# Patient Record
Sex: FEMALE | Race: BLACK OR AFRICAN AMERICAN | NOT HISPANIC OR LATINO | Employment: FULL TIME | URBAN - METROPOLITAN AREA
[De-identification: names, ages, dates, MRNs, and addresses within clinical notes are randomized per-mention and may not be internally consistent; named-entity substitution may affect disease eponyms.]

---

## 2018-07-12 ENCOUNTER — OFFICE VISIT (OUTPATIENT)
Dept: URGENT CARE | Facility: URGENT CARE | Age: 38
End: 2018-07-12
Payer: COMMERCIAL

## 2018-07-12 ENCOUNTER — RADIANT APPOINTMENT (OUTPATIENT)
Dept: GENERAL RADIOLOGY | Facility: CLINIC | Age: 38
End: 2018-07-12
Attending: FAMILY MEDICINE
Payer: COMMERCIAL

## 2018-07-12 VITALS
RESPIRATION RATE: 18 BRPM | WEIGHT: 158.7 LBS | DIASTOLIC BLOOD PRESSURE: 60 MMHG | BODY MASS INDEX: 24.86 KG/M2 | SYSTOLIC BLOOD PRESSURE: 100 MMHG | TEMPERATURE: 98.4 F | OXYGEN SATURATION: 98 % | HEART RATE: 100 BPM

## 2018-07-12 DIAGNOSIS — H61.21 IMPACTED CERUMEN OF RIGHT EAR: ICD-10-CM

## 2018-07-12 DIAGNOSIS — J01.90 ACUTE SINUSITIS WITH SYMPTOMS > 10 DAYS: ICD-10-CM

## 2018-07-12 DIAGNOSIS — R05.9 COUGH: Primary | ICD-10-CM

## 2018-07-12 PROCEDURE — 99214 OFFICE O/P EST MOD 30 MIN: CPT | Performed by: FAMILY MEDICINE

## 2018-07-12 PROCEDURE — 71046 X-RAY EXAM CHEST 2 VIEWS: CPT | Mod: FY

## 2018-07-12 RX ORDER — AZITHROMYCIN 250 MG/1
TABLET, FILM COATED ORAL
Qty: 6 TABLET | Refills: 0 | Status: SHIPPED | OUTPATIENT
Start: 2018-07-12 | End: 2018-07-17

## 2018-07-12 RX ORDER — MOMETASONE FUROATE MONOHYDRATE 50 UG/1
2 SPRAY, METERED NASAL DAILY
Qty: 1 G | Refills: 0 | Status: SHIPPED | OUTPATIENT
Start: 2018-07-12 | End: 2018-08-11

## 2018-07-12 NOTE — MR AVS SNAPSHOT
"              After Visit Summary   2018    Yun Marques    MRN: [id number]           Patient Information     Date Of Birth          1980        Visit Information        Provider Department      2018 5:15 PM Hilario Hogan DO [facility]        Today's Diagnoses     Cough    -  1    Acute sinusitis with symptoms > 10 days        Impacted cerumen of right ear           Follow-ups after your visit        Who to contact     If you have questions or need follow up information about today's clinic visit or your schedule please contact [facility] directly at [phone].  Normal or non-critical lab and imaging results will be communicated to you by ProPublicahart, letter or phone within 4 business days after the clinic has received the results. If you do not hear from us within 7 days, please contact the clinic through ProPublicahart or phone. If you have a critical or abnormal lab result, we will notify you by phone as soon as possible.  Submit refill requests through Trippy or call your pharmacy and they will forward the refill request to us. Please allow 3 business days for your refill to be completed.          Additional Information About Your Visit        MyChart Information     Trippy lets you send messages to your doctor, view your test results, renew your prescriptions, schedule appointments and more. To sign up, go to [email or web address] . Click on \"Log in\" on the left side of the screen, which will take you to the Welcome page. Then click on \"Sign up Now\" on the right side of the page.     You will be asked to enter the access code listed below, as well as some personal information. Please follow the directions to create your username and password.     Your access code is: [email or web address]  Expires: 10/10/2018  6:19 PM     Your access code will  in 90 days. If you need help or a new code, please call your [place] clinic or [phone].   "      Care EveryWhere ID     This is your Care EveryWhere ID. This could be used by other organizations to access your [place] medical records  [id number]        Your Vitals Were     Pulse Temperature Respirations Pulse Oximetry BMI (Body Mass Index)       100 98.4  F (36.9  C) (Oral) 18 98% 24.86 kg/m2        Blood Pressure from Last 3 Encounters:   07/12/18 100/60   02/09/16 128/78   09/26/15 106/68    Weight from Last 3 Encounters:   07/12/18 158 lb 11.2 oz (72 kg)   02/09/16 145 lb (65.8 kg)   09/26/15 153 lb 1.6 oz (69.4 kg)              We Performed the Following     Nursing Communication 1     XR Chest 2 Views          Today's Medication Changes          These changes are accurate as of 7/12/18  6:19 PM.  If you have any questions, ask your nurse or doctor.               Start taking these medicines.        Dose/Directions    azithromycin 250 MG tablet   Commonly known as:  ZITHROMAX   Used for:  Acute sinusitis with symptoms > 10 days   Started by:  Hilario Hogan, DO        Two tablets first day, then one tablet daily for four days.   Quantity:  6 tablet   Refills:  0       mometasone 50 MCG/ACT spray   Commonly known as:  NASONEX   Used for:  Acute sinusitis with symptoms > 10 days   Started by:  Hilario Hogan DO        Dose:  2 spray   Spray 2 sprays into both nostrils daily   Quantity:  1 g   Refills:  0            Where to get your medicines      These medications were sent to [facility] [address] [place] [address] [address] [facility] [address], [facility] [address]     Phone:  [phone]     azithromycin 250 MG tablet    mometasone 50 MCG/ACT spray                Primary Care Provider Office Phone # Fax #    Donato Pat [facility] [phone]       [facility] [address]        Equal Access to Services     MARIE KAUR AH: Darian thomas Sojose, waaxda luqadaha, qaybta kaalmaru wiley  krystin clayton ah. So [facility] [phone].    ATENCIÓN: Si zeniala indu, tiene a moore disposición servicios gratuitos de asistencia lingüística. Shereen al [phone].    We comply with applicable federal civil rights laws and [place] laws. We do not discriminate on the basis of race, color, national origin, age, disability, sex, sexual orientation, or gender identity.            Thank you!     Thank you for choosing [facility]  for your care. Our goal is always to provide you with excellent care. Hearing back from our patients is one way we can continue to improve our services. Please take a few minutes to complete the written survey that you may receive in the mail after your visit with us. Thank you!             Your Updated Medication List - Protect others around you: Learn how to safely use, store and throw away your medicines at www.disposemymeds.org.          This list is accurate as of 7/12/18  6:19 PM.  Always use your most recent med list.                   Brand Name Dispense Instructions for use Diagnosis    azithromycin 250 MG tablet    ZITHROMAX    6 tablet    Two tablets first day, then one tablet daily for four days.    Acute sinusitis with symptoms > 10 days       mometasone 50 MCG/ACT spray    NASONEX    1 g    Spray 2 sprays into both nostrils daily    Acute sinusitis with symptoms > 10 days

## 2018-07-12 NOTE — PROGRESS NOTES
SUBJECTIVE: Yun Marques is a 38 year old female presenting with a chief complaint of nasal congestion and cough. Also rt ear plugged  Onset of symptoms was 3 month(s) ago.  Course of illness is worsening.    Severity moderate  Current and Associated symptoms: runny nose, cough - non-productive and facial pain/pressure  Treatment measures tried include None tried.  Predisposing factors include None quit tobacco 2 months ago.    No past medical history on file.  Allergies   Allergen Reactions     Sulfa Drugs      HAD SEVERE REACTION- SWELLING BREATHING PROBS, WAS HOSPITALIZED     Social History   Substance Use Topics     Smoking status: Current Some Day Smoker     Smokeless tobacco: Never Used      Comment: 1-2 cigarettes per month(socially) - 09/26/15: hasn't smoked in months     Alcohol use Not on file       ROS:  SKIN: no rash  GI: no vomiting    OBJECTIVE:  /60  Pulse 100  Temp 98.4  F (36.9  C) (Oral)  Resp 18  Wt 158 lb 11.2 oz (72 kg)  SpO2 98%  BMI 24.86 kg/r5IOPJRCP APPEARANCE: healthy, alert and no distress  EYES: EOMI,  PERRL, conjunctiva clear  HENT: TM's normal bilaterally, cerumen right, rhinorrhea yellow, oral mucous membranes moist, no erythema noted and maxillary sinus tenderness   NECK: supple, nontender, no lymphadenopathy  RESP: lungs clear to auscultation - no rales, rhonchi or wheezes  SKIN: no suspicious lesions or rashes      ICD-10-CM    1. Cough R05 XR Chest 2 Views   2. Acute sinusitis with symptoms > 10 days J01.90 azithromycin (ZITHROMAX) 250 MG tablet     mometasone (NASONEX) 50 MCG/ACT spray   3. Impacted cerumen of right ear H61.21 Nursing Communication 1     Fluids/Rest, f/u if worse/not any better

## 2019-02-17 ENCOUNTER — OFFICE VISIT (OUTPATIENT)
Dept: URGENT CARE | Facility: URGENT CARE | Age: 39
End: 2019-02-17
Payer: COMMERCIAL

## 2019-02-17 VITALS
HEART RATE: 88 BPM | DIASTOLIC BLOOD PRESSURE: 80 MMHG | SYSTOLIC BLOOD PRESSURE: 114 MMHG | WEIGHT: 162 LBS | OXYGEN SATURATION: 98 % | TEMPERATURE: 98.4 F | RESPIRATION RATE: 20 BRPM | BODY MASS INDEX: 25.37 KG/M2

## 2019-02-17 DIAGNOSIS — R07.89 CHEST TIGHTNESS: Primary | ICD-10-CM

## 2019-02-17 DIAGNOSIS — R05.9 COUGH: ICD-10-CM

## 2019-02-17 DIAGNOSIS — R05.8 PRODUCTIVE COUGH: ICD-10-CM

## 2019-02-17 PROCEDURE — 99214 OFFICE O/P EST MOD 30 MIN: CPT | Performed by: PHYSICIAN ASSISTANT

## 2019-02-17 RX ORDER — ALBUTEROL SULFATE 90 UG/1
2 AEROSOL, METERED RESPIRATORY (INHALATION) EVERY 6 HOURS
Qty: 1 INHALER | Refills: 0 | Status: SHIPPED | OUTPATIENT
Start: 2019-02-17 | End: 2022-09-09

## 2019-02-17 RX ORDER — BENZONATATE 200 MG/1
200 CAPSULE ORAL 3 TIMES DAILY PRN
Qty: 21 CAPSULE | Refills: 0 | Status: SHIPPED | OUTPATIENT
Start: 2019-02-17 | End: 2019-02-24

## 2019-02-17 RX ORDER — AZITHROMYCIN 250 MG/1
TABLET, FILM COATED ORAL
Qty: 6 TABLET | Refills: 0 | Status: SHIPPED | OUTPATIENT
Start: 2019-02-17 | End: 2022-09-09

## 2019-03-01 NOTE — PROGRESS NOTES
SUBJECTIVE:   Yun Marques is a 38 year old female presenting with a chief complaint of runny nose, stuffy nose, cough - non-productive and facial pain/pressure.  Onset of symptoms was 1 month(s) ago.  Course of illness is same.    Severity moderate  Current and Associated symptoms: stuffy nose, cough - productive and chest congestion  Treatment measures tried include Decongestants and OTC Cough med.  Predisposing factors include recent illness .    PMH  Obesity  UTI     Allergies   Allergen Reactions     Sulfa Drugs      HAD SEVERE REACTION- SWELLING BREATHING PROBS, WAS HOSPITALIZED         Social History     Tobacco Use     Smoking status: Current Some Day Smoker     Smokeless tobacco: Never Used     Tobacco comment: 1-2 cigarettes per month(socially) - 09/26/15: hasn't smoked in months   Substance Use Topics     Alcohol use: Not on file       ROS:  CONSTITUTIONAL:POSITIVE  for chills  INTEGUMENTARY/SKIN: NEGATIVE for worrisome rashes, moles or lesions  EYES: NEGATIVE for vision changes or irritation  ENT/MOUTH: POSITIVE for postnasal drainage  RESP:POSITIVE for cough-productive  CV: NEGATIVE for chest pain, palpitations or peripheral edema  GI: NEGATIVE for nausea, abdominal pain, heartburn, or change in bowel habits  : negative for and dysuria  MUSCULOSKELETAL: NEGATIVE for significant arthralgias or myalgia  NEURO: NEGATIVE for weakness, dizziness or paresthesias    OBJECTIVE  :/80 (Cuff Size: Adult Regular)   Pulse 88   Temp 98.4  F (36.9  C) (Oral)   Resp 20   Wt 73.5 kg (162 lb)   SpO2 98%   BMI 25.37 kg/m    GENERAL APPEARANCE: healthy, alert and no distress  EYES: EOMI,  PERRL, conjunctiva clear  HENT: ear canals and TM's normal.  Nose and mouth without ulcers, erythema or lesions  NECK: supple, nontender, no lymphadenopathy  RESP: lungs clear to auscultation - no rales, rhonchi or wheezes  CV: regular rates and rhythm, normal S1 S2, no murmur noted  ABDOMEN:  soft, nontender, no HSM or  masses and bowel sounds normal  Extremities: no peripheral edema or tenderness, peripheral pulses normal  MS: extremities normal- no gross deformities noted, no erythema, FROM noted in all extremities  NEURO: Normal strength and tone, sensory exam grossly normal,  normal speech and mentation  SKIN: no suspicious lesions or rashes    ASSESSMENT/PLAN      ICD-10-CM    1. Chest tightness R07.89 albuterol (PROVENTIL HFA) 108 (90 Base) MCG/ACT inhaler   2. Cough R05 albuterol (PROVENTIL HFA) 108 (90 Base) MCG/ACT inhaler     benzonatate (TESSALON) 200 MG capsule   3. Productive cough R05 azithromycin (ZITHROMAX) 250 MG tablet       Orders Placed This Encounter     azithromycin (ZITHROMAX) 250 MG tablet     albuterol (PROVENTIL HFA) 108 (90 Base) MCG/ACT inhaler     benzonatate (TESSALON) 200 MG capsule       Tylenol, Fluids, Rest, OTC cough suppressant/expectorant and Saline gargles  Follow up with PCP as needed  See orders in Epic

## 2019-03-12 ENCOUNTER — OFFICE VISIT (OUTPATIENT)
Dept: URGENT CARE | Facility: URGENT CARE | Age: 39
End: 2019-03-12
Payer: COMMERCIAL

## 2019-03-12 VITALS
TEMPERATURE: 99.7 F | BODY MASS INDEX: 25.84 KG/M2 | HEART RATE: 110 BPM | DIASTOLIC BLOOD PRESSURE: 80 MMHG | SYSTOLIC BLOOD PRESSURE: 108 MMHG | OXYGEN SATURATION: 99 % | WEIGHT: 165 LBS

## 2019-03-12 DIAGNOSIS — J30.9 ALLERGIC RHINITIS, UNSPECIFIED SEASONALITY, UNSPECIFIED TRIGGER: Primary | ICD-10-CM

## 2019-03-12 PROCEDURE — 99213 OFFICE O/P EST LOW 20 MIN: CPT | Performed by: PHYSICIAN ASSISTANT

## 2019-03-12 RX ORDER — MOMETASONE FUROATE MONOHYDRATE 50 UG/1
2 SPRAY, METERED NASAL DAILY
Qty: 1 BOX | Refills: 3 | Status: SHIPPED | OUTPATIENT
Start: 2019-03-12 | End: 2022-09-09

## 2019-03-13 NOTE — PROGRESS NOTES
Patient presents with:  Urgent Care: cough,sore throat, sinus problems. 4xdays      SUBJECTIVE:   Yun Marques is a 39 year old female presenting with a chief complaint of   1) dry cough for a week  2) sore throat since yesterday  3) sinus congestion for a few days    Denies any body aches    Low grade fever     Onset of symptoms was as above.    Consistent with her typical allergies for which she usually uses Nasonex and Claritin D    Treatment measures tried include none tried.  Predisposing factors include seasonal allergies.    No past medical history on file.  There is no problem list on file for this patient.    Social History     Tobacco Use     Smoking status: Current Some Day Smoker     Smokeless tobacco: Never Used     Tobacco comment: 1-2 cigarettes per month(socially) - 09/26/15: hasn't smoked in months   Substance Use Topics     Alcohol use: Not on file       ROS:  CONSTITUTIONAL:NEGATIVE for fever, chills, change in weight  INTEGUMENTARY/SKIN: NEGATIVE for worrisome rashes, moles or lesions  EYES: NEGATIVE for vision changes or irritation  ENT/MOUTH: as per HPI  RESP:as per HPI  CV: NEGATIVE for chest pain, palpitations or peripheral edema  GI: NEGATIVE for nausea, abdominal pain, heartburn, or change in bowel habits  MUSCULOSKELETAL: NEGATIVE for significant arthralgias or myalgia  NEURO: NEGATIVE for weakness, dizziness or paresthesias  Review of systems negative except as stated above.    OBJECTIVE  :/80   Pulse 110   Temp 99.7  F (37.6  C) (Tympanic)   Wt 74.8 kg (165 lb)   SpO2 99%   BMI 25.84 kg/m    GENERAL APPEARANCE: healthy, alert and no distress  EYES: EOMI,  PERRL, conjunctiva clear  HENT: ear canals and TM's normal.  Nose and mouth without ulcers, erythema or lesions  HENT: nasal turbinates boggy with bluish hue and rhinorrhea clear  NECK: supple, nontender, no lymphadenopathy  RESP: lungs clear to auscultation - no rales, rhonchi or wheezes  CV: regular rates and rhythm,  normal S1 S2, no murmur noted  ABDOMEN:  soft, nontender, no HSM or masses and bowel sounds normal  NEURO: Normal strength and tone, sensory exam grossly normal,  normal speech and mentation  SKIN: no suspicious lesions or rashes    (J30.9) Allergic rhinitis, unspecified seasonality, unspecified trigger  (primary encounter diagnosis)  Comment: with recent sinusitis on 2/17/19  Plan: mometasone (NASONEX) 50 MCG/ACT nasal spray,         loratadine-pseudoePHEDrine (CLARITIN-D 12-HOUR)        5-120 MG 12 hr tablet          Patient declines sinus film today

## 2019-03-13 NOTE — PATIENT INSTRUCTIONS
(J30.9) Allergic rhinitis, unspecified seasonality, unspecified trigger  (primary encounter diagnosis)  Comment:   Plan: mometasone (NASONEX) 50 MCG/ACT nasal spray,         loratadine-pseudoePHEDrine (CLARITIN-D 12-HOUR)        5-120 MG 12 hr tablet            Consider taking daily PLAIN claritin 10 mg during allergy season and adding a decongestant combination claritin as needed.  Use nasonex daily during allergy season, bump up to twice a day for the first couple of days      Use saline nasal spray during day    Follow up with primary clinic should symptoms persist or worsen.

## 2019-11-07 ENCOUNTER — HEALTH MAINTENANCE LETTER (OUTPATIENT)
Age: 39
End: 2019-11-07

## 2020-11-29 ENCOUNTER — HEALTH MAINTENANCE LETTER (OUTPATIENT)
Age: 40
End: 2020-11-29

## 2021-09-19 ENCOUNTER — HEALTH MAINTENANCE LETTER (OUTPATIENT)
Age: 41
End: 2021-09-19

## 2022-01-09 ENCOUNTER — HEALTH MAINTENANCE LETTER (OUTPATIENT)
Age: 42
End: 2022-01-09

## 2022-09-09 ENCOUNTER — OFFICE VISIT (OUTPATIENT)
Dept: FAMILY MEDICINE | Facility: CLINIC | Age: 42
End: 2022-09-09
Payer: COMMERCIAL

## 2022-09-09 VITALS
BODY MASS INDEX: 26.62 KG/M2 | SYSTOLIC BLOOD PRESSURE: 140 MMHG | WEIGHT: 169.6 LBS | OXYGEN SATURATION: 99 % | HEART RATE: 93 BPM | HEIGHT: 67 IN | DIASTOLIC BLOOD PRESSURE: 90 MMHG | TEMPERATURE: 98.1 F

## 2022-09-09 DIAGNOSIS — F32.1 CURRENT MODERATE EPISODE OF MAJOR DEPRESSIVE DISORDER WITHOUT PRIOR EPISODE (H): ICD-10-CM

## 2022-09-09 DIAGNOSIS — Z13.220 ENCOUNTER FOR LIPID SCREENING FOR CARDIOVASCULAR DISEASE: ICD-10-CM

## 2022-09-09 DIAGNOSIS — Z13.21 ENCOUNTER FOR VITAMIN DEFICIENCY SCREENING: ICD-10-CM

## 2022-09-09 DIAGNOSIS — F41.1 GENERALIZED ANXIETY DISORDER: ICD-10-CM

## 2022-09-09 DIAGNOSIS — R14.2 FLATULENCE, ERUCTATION AND GAS PAIN: ICD-10-CM

## 2022-09-09 DIAGNOSIS — R03.0 ELEVATED BLOOD PRESSURE READING WITHOUT DIAGNOSIS OF HYPERTENSION: ICD-10-CM

## 2022-09-09 DIAGNOSIS — K21.9 GASTROESOPHAGEAL REFLUX DISEASE, UNSPECIFIED WHETHER ESOPHAGITIS PRESENT: ICD-10-CM

## 2022-09-09 DIAGNOSIS — Z11.4 SCREENING FOR HIV (HUMAN IMMUNODEFICIENCY VIRUS): ICD-10-CM

## 2022-09-09 DIAGNOSIS — Z11.59 NEED FOR HEPATITIS C SCREENING TEST: ICD-10-CM

## 2022-09-09 DIAGNOSIS — Z13.6 ENCOUNTER FOR LIPID SCREENING FOR CARDIOVASCULAR DISEASE: ICD-10-CM

## 2022-09-09 DIAGNOSIS — R63.5 ABNORMAL WEIGHT GAIN: ICD-10-CM

## 2022-09-09 DIAGNOSIS — Z00.00 ROUTINE GENERAL MEDICAL EXAMINATION AT A HEALTH CARE FACILITY: Primary | ICD-10-CM

## 2022-09-09 DIAGNOSIS — M54.50 LEFT-SIDED LOW BACK PAIN WITHOUT SCIATICA, UNSPECIFIED CHRONICITY: ICD-10-CM

## 2022-09-09 DIAGNOSIS — J01.90 ACUTE SINUSITIS WITH SYMPTOMS > 10 DAYS: ICD-10-CM

## 2022-09-09 DIAGNOSIS — J98.01 COLD INDUCED BRONCHOSPASM: ICD-10-CM

## 2022-09-09 DIAGNOSIS — D25.9 UTERINE LEIOMYOMA, UNSPECIFIED LOCATION: ICD-10-CM

## 2022-09-09 DIAGNOSIS — R14.3 FLATULENCE, ERUCTATION AND GAS PAIN: ICD-10-CM

## 2022-09-09 DIAGNOSIS — N89.8 VAGINAL DISCHARGE: ICD-10-CM

## 2022-09-09 DIAGNOSIS — Z12.4 CERVICAL CANCER SCREENING: ICD-10-CM

## 2022-09-09 DIAGNOSIS — R14.1 FLATULENCE, ERUCTATION AND GAS PAIN: ICD-10-CM

## 2022-09-09 DIAGNOSIS — R10.819 SUPRAPUBIC TENDERNESS: ICD-10-CM

## 2022-09-09 DIAGNOSIS — Z11.3 ENCOUNTER FOR SCREENING EXAMINATION FOR SEXUALLY TRANSMITTED DISEASE: ICD-10-CM

## 2022-09-09 LAB
CLUE CELLS: NORMAL
TRICHOMONAS, WET PREP: NORMAL
WBC'S/HIGH POWER FIELD, WET PREP: NORMAL
YEAST, WET PREP: NORMAL

## 2022-09-09 PROCEDURE — 80061 LIPID PANEL: CPT | Performed by: INTERNAL MEDICINE

## 2022-09-09 PROCEDURE — 87340 HEPATITIS B SURFACE AG IA: CPT | Performed by: INTERNAL MEDICINE

## 2022-09-09 PROCEDURE — 99386 PREV VISIT NEW AGE 40-64: CPT | Mod: 25 | Performed by: INTERNAL MEDICINE

## 2022-09-09 PROCEDURE — 87591 N.GONORRHOEAE DNA AMP PROB: CPT | Performed by: INTERNAL MEDICINE

## 2022-09-09 PROCEDURE — 87491 CHLMYD TRACH DNA AMP PROBE: CPT | Performed by: INTERNAL MEDICINE

## 2022-09-09 PROCEDURE — 84443 ASSAY THYROID STIM HORMONE: CPT | Performed by: INTERNAL MEDICINE

## 2022-09-09 PROCEDURE — 99215 OFFICE O/P EST HI 40 MIN: CPT | Mod: 25 | Performed by: INTERNAL MEDICINE

## 2022-09-09 PROCEDURE — 86709 HEPATITIS A IGM ANTIBODY: CPT | Performed by: INTERNAL MEDICINE

## 2022-09-09 PROCEDURE — 87210 SMEAR WET MOUNT SALINE/INK: CPT | Performed by: INTERNAL MEDICINE

## 2022-09-09 PROCEDURE — G0145 SCR C/V CYTO,THINLAYER,RESCR: HCPCS | Performed by: INTERNAL MEDICINE

## 2022-09-09 PROCEDURE — 87389 HIV-1 AG W/HIV-1&-2 AB AG IA: CPT | Performed by: INTERNAL MEDICINE

## 2022-09-09 PROCEDURE — 86803 HEPATITIS C AB TEST: CPT | Performed by: INTERNAL MEDICINE

## 2022-09-09 PROCEDURE — 36415 COLL VENOUS BLD VENIPUNCTURE: CPT | Performed by: INTERNAL MEDICINE

## 2022-09-09 PROCEDURE — 90677 PCV20 VACCINE IM: CPT | Performed by: INTERNAL MEDICINE

## 2022-09-09 PROCEDURE — 87624 HPV HI-RISK TYP POOLED RSLT: CPT | Performed by: INTERNAL MEDICINE

## 2022-09-09 PROCEDURE — 90471 IMMUNIZATION ADMIN: CPT | Performed by: INTERNAL MEDICINE

## 2022-09-09 RX ORDER — BLOOD PRESSURE TEST KIT
KIT MISCELLANEOUS
Qty: 1 KIT | Refills: 0 | Status: SHIPPED | OUTPATIENT
Start: 2022-09-09

## 2022-09-09 RX ORDER — AZITHROMYCIN 250 MG/1
TABLET, FILM COATED ORAL
Qty: 6 TABLET | Refills: 0 | Status: SHIPPED | OUTPATIENT
Start: 2022-09-09 | End: 2022-11-29

## 2022-09-09 RX ORDER — PREDNISONE 20 MG/1
40 TABLET ORAL DAILY
Qty: 10 TABLET | Refills: 0 | Status: SHIPPED | OUTPATIENT
Start: 2022-09-09 | End: 2022-12-08

## 2022-09-09 RX ORDER — ALBUTEROL SULFATE 90 UG/1
2 AEROSOL, METERED RESPIRATORY (INHALATION) EVERY 6 HOURS
Qty: 18 G | Refills: 1 | Status: SHIPPED | OUTPATIENT
Start: 2022-09-09 | End: 2022-09-09

## 2022-09-09 RX ORDER — SIMETHICONE 80 MG
80 TABLET,CHEWABLE ORAL EVERY 6 HOURS PRN
Qty: 30 TABLET | Refills: 1 | Status: SHIPPED | OUTPATIENT
Start: 2022-09-09

## 2022-09-09 RX ORDER — FLUTICASONE PROPIONATE 50 MCG
SPRAY, SUSPENSION (ML) NASAL
COMMUNITY
Start: 2021-12-04

## 2022-09-09 RX ORDER — ALBUTEROL SULFATE 90 UG/1
2 AEROSOL, METERED RESPIRATORY (INHALATION) EVERY 6 HOURS
Qty: 18 G | Refills: 0 | Status: SHIPPED | OUTPATIENT
Start: 2022-09-09 | End: 2022-11-29

## 2022-09-09 RX ORDER — SERTRALINE HYDROCHLORIDE 25 MG/1
25 TABLET, FILM COATED ORAL DAILY
Qty: 30 TABLET | Refills: 1 | Status: SHIPPED | OUTPATIENT
Start: 2022-09-09

## 2022-09-09 ASSESSMENT — ENCOUNTER SYMPTOMS
BREAST MASS: 0
ABDOMINAL PAIN: 1
COUGH: 1
CONSTIPATION: 1
HEARTBURN: 1

## 2022-09-09 ASSESSMENT — PATIENT HEALTH QUESTIONNAIRE - PHQ9
10. IF YOU CHECKED OFF ANY PROBLEMS, HOW DIFFICULT HAVE THESE PROBLEMS MADE IT FOR YOU TO DO YOUR WORK, TAKE CARE OF THINGS AT HOME, OR GET ALONG WITH OTHER PEOPLE: SOMEWHAT DIFFICULT
SUM OF ALL RESPONSES TO PHQ QUESTIONS 1-9: 15
SUM OF ALL RESPONSES TO PHQ QUESTIONS 1-9: 15

## 2022-09-09 ASSESSMENT — PAIN SCALES - GENERAL: PAINLEVEL: MILD PAIN (2)

## 2022-09-09 NOTE — PATIENT INSTRUCTIONS
As discussed , please do fasting labs as ordered.   Started on depresison medication   Please do physical therapy with muscle relaxor as needed only in the night as it causes drowsiness.   Sent in antibiotic , prednisone and Albuterol inhaler for your Cough and sinusitis.     Please send me BP log for next 10 days to decide on starting medication if high . Please make E-visit in 10 days .     Started on Omeprazole and anti flatulant for symptoms.   ========================    Preventive Health Recommendations  Female Ages 40 to 49    Yearly exam:   See your health care provider every year in order to  Review health changes.   Discuss preventive care.    Review your medicines if your doctor prescribed any.    Get a Pap test every three years (unless you have an abnormal result and your provider advises testing more often).    If you get Pap tests with HPV test, you only need to test every 5 years, unless you have an abnormal result. You do not need a Pap test if your uterus was removed (hysterectomy) and you have not had cancer.    You should be tested each year for STDs (sexually transmitted diseases), if you're at risk.   Ask your doctor if you should have a mammogram.    Have a colonoscopy (test for colon cancer) if someone in your family has had colon cancer or polyps before age 50.     Have a cholesterol test every 5 years.     Have a diabetes test (fasting glucose) after age 45. If you are at risk for diabetes, you should have this test every 3 years.    Shots: Get a flu shot each year. Get a tetanus shot every 10 years.     Nutrition:   Eat at least 5 servings of fruits and vegetables each day.  Eat whole-grain bread, whole-wheat pasta and brown rice instead of white grains and rice.  Get adequate Calcium and Vitamin D.      Lifestyle  Exercise at least 150 minutes a week (an average of 30 minutes a day, 5 days a week). This will help you control your weight and prevent disease.  Limit alcohol to one drink per  day.  No smoking.   Wear sunscreen to prevent skin cancer.  See your dentist every six months for an exam and cleaning.

## 2022-09-09 NOTE — PROGRESS NOTES
SUBJECTIVE:   CC: Yun Marques is an 42 year old woman who presents for preventive health visit.     Patient has been advised of split billing requirements and indicates understanding: Yes  Healthy Habits:     Getting at least 3 servings of Calcium per day:  Yes    Bi-annual eye exam:  NO    Dental care twice a year:  Yes    Sleep apnea or symptoms of sleep apnea:  Daytime drowsiness    Diet:  Regular (no restrictions) and Breakfast skipped    Duration of exercise:  45-60 minutes    Taking medications regularly:  Yes    Medication side effects:  Not applicable    PHQ-2 Total Score: 6    Additional concerns today:  Yes      Today's PHQ-2 Score:   PHQ-2 (  [employer]) 2022   Q1: Little interest or pleasure in doing things 3   Q2: Feeling down, depressed or hopeless 3   PHQ-2 Score 6   Q1: Little interest or pleasure in doing things Nearly every day   Q2: Feeling down, depressed or hopeless Several days   PHQ-2 Score 4       Abuse: Current or Past (Physical, Sexual or Emotional) - No  Do you feel safe in your environment? Yes    Have you ever done Advance Care Planning? (For example, a Health Directive, POLST, or a discussion with a medical provider or your loved ones about your wishes): No, advance care planning information given to patient to review.  Patient plans to discuss their wishes with loved ones or provider.      Social History     Tobacco Use     Smoking status: Former Smoker     Years: 10.00     Types: Cigarettes     Start date: 2000     Quit date: 2017     Years since quittin.6     Smokeless tobacco: Never Used     Tobacco comment: 1-2 cigarettes per month(socially) - 09/26/15: hasn't smoked in months   Substance Use Topics     Alcohol use: Yes     If you drink alcohol do you typically have >3 drinks per day or >7 drinks per week? Yes    Alcohol Use 2022   Prescreen: >3 drinks/day or >7 drinks/week? Yes   AUDIT SCORE  14     AUDIT - Alcohol Use Disorders Identification Test -  Reproduced from the World Health Organization Audit 2001 (Second Edition) 9/9/2022   1.  How often do you have a drink containing alcohol? 4 or more times a week   2.  How many drinks containing alcohol do you have on a typical day when you are drinking? 3 or 4   3.  How often do you have five or more drinks on one occasion? Daily or almost daily   4.  How often during the last year have you found that you were not able to stop drinking once you had started? Daily or almost daily   5.  How often during the last year have you failed to do what was normally expected of you because of drinking? Never   6.  How often during the last year have you needed a first drink in the morning to get yourself going after a heavy drinking session? Less than monthly   7.  How often during the last year have you had a feeling of guilt or remorse after drinking? Never   8.  How often during the last year have you been unable to remember what happened the night before because of your drinking? Never   9.  Have you or someone else been injured because of your drinking? No   10. Has a relative, friend, doctor or other health care worker been concerned about your drinking or suggested you cut down? No   TOTAL SCORE 14       Reviewed orders with patient.  Reviewed health maintenance and updated orders accordingly - Yes  Lab work is in process  Labs reviewed in EPIC    Breast Cancer Screening:    FHS-7:   Breast CA Risk Assessment (FHS-7) 9/9/2022   Did any of your first-degree relatives have breast or ovarian cancer? Unknown   Did any of your relatives have bilateral breast cancer? Yes   Did any man in your family have breast cancer? No   Did any woman in your family have breast and ovarian cancer? No   Did any woman in your family have breast cancer before age 50 y? Yes   Do you have 2 or more relatives with breast and/or ovarian cancer? No   Do you have 2 or more relatives with breast and/or bowel cancer? No     click delete button to  "remove this line now  Mammogram Screening - Offered annual screening and updated Health Maintenance for mutual plan based on risk factor consideration    Pertinent mammograms are reviewed under the imaging tab.    History of abnormal Pap smear: NO - age 30- 65 PAP every 3 years recommended     Reviewed and updated as needed this visit by clinical staff   Tobacco  Allergies  Meds  Problems  Med Hx  Surg Hx  Fam Hx            Reviewed and updated as needed this visit by Provider   Tobacco  Allergies  Meds  Problems  Med Hx  Surg Hx  Fam Hx           Past Medical History:   Diagnosis Date     Uncomplicated asthma       History reviewed. No pertinent surgical history.  OB History   No obstetric history on file.       Review of Systems   HENT: Positive for congestion.    Respiratory: Positive for cough.    Gastrointestinal: Positive for abdominal pain, constipation and heartburn.   Breasts:  Negative for tenderness, breast mass and discharge.   Genitourinary: Negative for pelvic pain, vaginal bleeding and vaginal discharge.     CONSTITUTIONAL: NEGATIVE for fever, chills, change in weight  INTEGUMENTARU/SKIN: NEGATIVE for worrisome rashes, moles or lesions  EYES: NEGATIVE for vision changes or irritation  ENT: NEGATIVE for ear, mouth and throat problems  RESP: NEGATIVE for significant cough or SOB  BREAST: NEGATIVE for masses, tenderness or discharge  CV: NEGATIVE for chest pain, palpitations or peripheral edema  GI: NEGATIVE for nausea, abdominal pain, heartburn, or change in bowel habits  : NEGATIVE for unusual urinary or vaginal symptoms. Periods are regular.  MUSCULOSKELETAL: NEGATIVE for significant arthralgias or myalgia  NEURO: NEGATIVE for weakness, dizziness or paresthesias  PSYCHIATRIC: NEGATIVE for changes in mood or affect     OBJECTIVE:   BP (!) 140/90   Pulse 93   Temp 98.1  F (36.7  C) (Temporal)   Ht 1.702 m (5' 7\")   Wt 76.9 kg (169 lb 9.6 oz)   SpO2 99%   BMI 26.56 kg/m    Physical " Exam  GENERAL: healthy, alert and no distress  EYES: Eyes grossly normal to inspection, PERRL and conjunctivae and sclerae normal  HENT: ear canals and TM's normal, nose and mouth without ulcers or lesions. Positive for sinus tenderness  NECK: no adenopathy, no asymmetry, masses, or scars and thyroid normal to palpation  RESP: lungs clear to auscultation - no rales, rhonchi or wheezes  BREAST: normal without masses, tenderness or nipple discharge and no palpable axillary masses or adenopathy  CV: regular rate and rhythm, normal S1 S2, no S3 or S4, no murmur, click or rub, no peripheral edema and peripheral pulses strong  ABDOMEN: soft, Positive for generalized abdominal discomfort and suprapubic tenderness , no hepatosplenomegaly, no masses and bowel sounds normal  MS: no gross musculoskeletal defects noted, no edema  Positive for lower left paraspinal muscle spasm on the lumbar region.   SKIN: no suspicious lesions or rashes  NEURO: Normal strength and tone, mentation intact and speech normal  PSYCH: mentation appears normal, affect normal/bright    Pelvic Exam:  Vulva: No external lesions, normal hair distribution, no adenopathy  Vagina: Moist, pink, no abnormal discharge, well rugated, no lesions  Cervix: Pap smear is taken, parous, smooth, pink, no visible lesions  Uterus: Normal size, anteverted, non-tender, mobile  Ovaries: No mass, non-tender, mobile    Diagnostic Test Results:  Labs reviewed in Epic    ASSESSMENT/PLAN:     Assessment and Plan  1. Routine general medical examination at a health care facility  Pt is new to [facility] has been folowing outside facilities, last checked in 2018 outside, for OCPs.but currently denies any needs as she states she is not sexually active She is here for physical but does have multiple new concerns of Sinusitis, Cough, Back pain, Abdominal discomfort , depresison   - REVIEW OF HEALTH MAINTENANCE PROTOCOL ORDERS  - Pneumococcal 20 Valent Conjugate (Prevnar 20)  - HIV Antigen  Antibody Combo; Future  - Hepatitis C Screen Reflex to HCV RNA Quant and Genotype; Future  - Pap Screen with HPV - recommended age 30 - 65 years  - HIV Antigen Antibody Combo  - Hepatitis C Screen Reflex to HCV RNA Quant and Genotype    2. Cervical cancer screening  - Pap Screen with HPV - recommended age 30 - 65 years    3. Acute sinusitis with symptoms > 10 days  New problem, , given the sinus tenderness and congestion will give antibiotic and prednisone for improvement.   - azithromycin (ZITHROMAX) 250 MG tablet; Two tablets first day, then one tablet daily for four days.  Dispense: 6 tablet; Refill: 0  - predniSONE (DELTASONE) 20 MG tablet; Take 2 tablets (40 mg) by mouth daily  Dispense: 10 tablet; Refill: 0    4. Cold induced bronchospasm  New problem, given the sinus tenderness and congestion will give antibiotic and prednisone for improvement along with Albuterol inhaler.   - albuterol (PROAIR HFA/PROVENTIL HFA/VENTOLIN HFA) 108 (90 Base) MCG/ACT inhaler; Inhale 2 puffs into the lungs every 6 hours  Dispense: 18 g; Refill: 0    5. Current moderate episode of major depressive disorder without prior episode (H)  6. Generalized anxiety disorder  New problem, Uncontrolled. Denies SI/HI. Will start on low dose Zoloft . Pt to revisit with PHQ and ADALI on E-visit in 4 weeks. Pt understood and agreed with the plan   - sertraline (ZOLOFT) 25 MG tablet; Take 1 tablet (25 mg) by mouth daily  Dispense: 30 tablet; Refill: 1  - TSH with free T4 reflex; Future  - TSH with free T4 reflex    7. Abnormal weight gain  - TSH with free T4 reflex; Future  - TSH with free T4 reflex    8. Vaginal discharge  New problem, will check vaginal swab and send treatment if positive   - Wet prep - lab collect; Future  - Wet prep - lab collect    9. Suprapubic tenderness  New problem, will check Urinalysis and send treatment if positive   - UA with Microscopic reflex to Culture - lab collect; Future    10. Uterine leiomyoma, unspecified  location  Chronic problem, possibly causing suprapubic tenderness on palpation and pelvic exam    11. Left-sided low back pain without sciatica, unspecified chronicity  New problem, with tenderness on palpation of the left paraspinal region in lumbar level. Will start on Zanaflex as needed, side effect explained. Placed referral to physical therapy.   - tiZANidine (ZANAFLEX) 4 MG tablet; Take 1 tablet (4 mg) by mouth nightly as needed for muscle spasms  Dispense: 30 tablet; Refill: 0  - Physical Therapy Referral; Future    12. Encounter for screening examination for sexually transmitted disease  - NEISSERIA GONORRHOEA PCR; Future  - CHLAMYDIA TRACHOMATIS PCR; Future  - Hepatitis B surface antigen; Future  - Hepatitis A antibody IgM; Future  - NEISSERIA GONORRHOEA PCR  - CHLAMYDIA TRACHOMATIS PCR  - Hepatitis B surface antigen  - Hepatitis A antibody IgM    13. Encounter for lipid screening for cardiovascular disease  - Lipid panel reflex to direct LDL Fasting; Future  - Lipid panel reflex to direct LDL Fasting    14. Encounter for vitamin deficiency screening  - Vitamin D Deficiency; Future    15. Elevated blood pressure reading without diagnosis of hypertension  Ongoing problem, Uncontrolled. Pt does have FH of HTN. Discussed on starting on low dose Lisinopril which she will let us know after checking her BP log in 10 days. Please see AVS for details.   - Blood Pressure KIT; Take BP twice daily  Dispense: 1 kit; Refill: 0    16. Need for hepatitis C screening test  - Hepatitis C Screen Reflex to HCV RNA Quant and Genotype; Future  - Hepatitis C Screen Reflex to HCV RNA Quant and Genotype    17. Screening for HIV (human immunodeficiency virus)  - HIV Antigen Antibody Combo; Future  - HIV Antigen Antibody Combo    18. Gastroesophageal reflux disease, unspecified whether esophagitis present  New problem, given Abdominal discomfort and flatulance will given trial of PPI for improvement.   - simethicone (MYLICON) 80 MG  chewable tablet; Take 1 tablet (80 mg) by mouth every 6 hours as needed for flatulence or cramping  Dispense: 30 tablet; Refill: 1  - omeprazole (PRILOSEC) 20 MG DR capsule; Take 1 capsule (20 mg) by mouth daily  Dispense: 30 capsule; Refill: 1    19. Flatulence, eructation and gas pain  New problem, due to above,. Will treat symptomatically.   - simethicone (MYLICON) 80 MG chewable tablet; Take 1 tablet (80 mg) by mouth every 6 hours as needed for flatulence or cramping  Dispense: 30 tablet; Refill: 1       Over 60 minutes spent on reviewing patient chart,  face to face encounter, greater than 50% time spent with plan/cordination of care and documentation as above in my A/P.           Patient Instructions   As discussed , please do fasting labs as ordered.   Started on depresison medication   Please do physical therapy with muscle relaxor as needed only in the night as it causes drowsiness.   Sent in antibiotic , prednisone and Albuterol inhaler for your Cough and sinusitis.     Please send me BP log for next 10 days to decide on starting medication if high . Please make E-visit in 10 days . \[phone]\      ========================    Preventive Health Recommendations  Female Ages 40 to 49    Yearly exam:     See your health care provider every year in order to  1. Review health changes.   2. Discuss preventive care.    3. Review your medicines if your doctor prescribed any.      Get a Pap test every three years (unless you have an abnormal result and your provider advises testing more often).      If you get Pap tests with HPV test, you only need to test every 5 years, unless you have an abnormal result. You do not need a Pap test if your uterus was removed (hysterectomy) and you have not had cancer.      You should be tested each year for STDs (sexually transmitted diseases), if you're at risk.     Ask your doctor if you should have a mammogram.      Have a colonoscopy (test for colon cancer) if  "someone in your family has had colon cancer or polyps before age 50.       Have a cholesterol test every 5 years.       Have a diabetes test (fasting glucose) after age 45. If you are at risk for diabetes, you should have this test every 3 years.    Shots: Get a flu shot each year. Get a tetanus shot every 10 years.     Nutrition:     Eat at least 5 servings of fruits and vegetables each day.    Eat whole-grain bread, whole-wheat pasta and brown rice instead of white grains and rice.    Get adequate Calcium and Vitamin D.      Lifestyle    Exercise at least 150 minutes a week (an average of 30 minutes a day, 5 days a week). This will help you control your weight and prevent disease.    Limit alcohol to one drink per day.    No smoking.     Wear sunscreen to prevent skin cancer.    See your dentist every six months for an exam and cleaning.    Return in about 6 months (around 3/9/2023), or if symptoms worsen or fail to improve, for Follow up of last visit, depression.    Therese Stewart MD  [facility]      Patient has been advised of split billing requirements and indicates understanding: Yes    COUNSELING:  Reviewed preventive health counseling, as reflected in patient instructions  Special attention given to:        Regular exercise       Healthy diet/nutrition       Vision screening       Hearing screening       Immunizations    Vaccinated for: Pneumococcal             Alcohol Use       Contraception       Safe sex practices/STD prevention       Consider Hep C screening for all patients one time for ages 18-79 years       HIV screeninx in teen years, 1x in adult years, and at intervals if high risk    Estimated body mass index is 26.56 kg/m  as calculated from the following:    Height as of this encounter: 1.702 m (5' 7\").    Weight as of this encounter: 76.9 kg (169 lb 9.6 oz).    Weight management plan: Discussed healthy diet and exercise guidelines    She reports that she quit " smoking about 5 years ago. Her smoking use included cigarettes. She started smoking about 22 years ago. She quit after 10.00 years of use. She has never used smokeless tobacco.  Nicotine/Tobacco Cessation Plan:   Information offered: Patient not interested at this time      Counseling Resources:  ATP IV Guidelines  Pooled Cohorts Equation Calculator  Breast Cancer Risk Calculator  BRCA-Related Cancer Risk Assessment: FHS-7 Tool  FRAX Risk Assessment  ICSI Preventive Guidelines  Dietary Guidelines for Americans, 2010  [employer] My[employer]ate  ASA Prophylaxis  Lung CA Screening    Therese Stewart MD  [facility] [place]  Answers for HPI/ROS submitted by the patient on 9/9/2022  If you checked off any problems, how difficult have these problems made it for you to do your work, take care of things at home, or get along with other people?: Somewhat difficult  PHQ9 TOTAL SCORE: 15

## 2022-09-10 LAB
C TRACH DNA SPEC QL NAA+PROBE: NEGATIVE
HAV IGM SERPL QL IA: NONREACTIVE
HBV SURFACE AG SERPL QL IA: NONREACTIVE
HCV AB SERPL QL IA: NONREACTIVE
HIV 1+2 AB+HIV1 P24 AG SERPL QL IA: NONREACTIVE
N GONORRHOEA DNA SPEC QL NAA+PROBE: NEGATIVE

## 2022-09-12 LAB
CHOLEST SERPL-MCNC: 224 MG/DL
FASTING STATUS PATIENT QL REPORTED: NO
HDLC SERPL-MCNC: 82 MG/DL
LDLC SERPL CALC-MCNC: 127 MG/DL
NONHDLC SERPL-MCNC: 142 MG/DL
TRIGL SERPL-MCNC: 77 MG/DL
TSH SERPL DL<=0.005 MIU/L-ACNC: 0.64 MU/L (ref 0.4–4)

## 2022-09-12 NOTE — RESULT ENCOUNTER NOTE
Your Cholesterol remaining high. Given your age and no cardiac risk factors , recommend dietery management of avoiding high fat foods and red meat . Life style measures on weight reduction recommended.     All your other labs SO FAR are normal, you may see some highlighted which do not have Clinical significance.Few pending labs till awaited , will update    Please let me know if you have any questions.  Therese Stewart MD on 9/12/2022 at 5:37 PM  [facility]

## 2022-09-13 LAB
BKR LAB AP GYN ADEQUACY: NORMAL
BKR LAB AP GYN INTERPRETATION: NORMAL
BKR LAB AP HPV REFLEX: NORMAL
BKR LAB AP PREVIOUS ABNORMAL: NORMAL
PATH REPORT.COMMENTS IMP SPEC: NORMAL
PATH REPORT.COMMENTS IMP SPEC: NORMAL
PATH REPORT.RELEVANT HX SPEC: NORMAL

## 2022-09-15 ENCOUNTER — TELEPHONE (OUTPATIENT)
Dept: FAMILY MEDICINE | Facility: CLINIC | Age: 42
End: 2022-09-15

## 2022-09-15 ENCOUNTER — NURSE TRIAGE (OUTPATIENT)
Dept: FAMILY MEDICINE | Facility: CLINIC | Age: 42
End: 2022-09-15

## 2022-09-15 DIAGNOSIS — B37.9 ANTIBIOTIC-INDUCED YEAST INFECTION: Primary | ICD-10-CM

## 2022-09-15 DIAGNOSIS — T36.95XA ANTIBIOTIC-INDUCED YEAST INFECTION: Primary | ICD-10-CM

## 2022-09-15 LAB
HUMAN PAPILLOMA VIRUS 16 DNA: NEGATIVE
HUMAN PAPILLOMA VIRUS 18 DNA: NEGATIVE
HUMAN PAPILLOMA VIRUS FINAL DIAGNOSIS: NORMAL
HUMAN PAPILLOMA VIRUS OTHER HR: NEGATIVE

## 2022-09-15 RX ORDER — FLUCONAZOLE 150 MG/1
150 TABLET ORAL ONCE
Qty: 1 TABLET | Refills: 0 | Status: SHIPPED | OUTPATIENT
Start: 2022-09-15 | End: 2022-09-15

## 2022-09-15 NOTE — TELEPHONE ENCOUNTER
Provider Response to 2nd Level Triage Request    I have reviewed the RN documentation. My recommendation is:  Sent in medication to pharmacy for yeast     Can you please update me on her current blood pressures if she checked is normalized and also her PHQ-9 which both were abnormal in her last visits.    Thank you,  Therese Stewart MD on 9/15/2022 at 9:57 AM

## 2022-09-15 NOTE — TELEPHONE ENCOUNTER
Patient called in stating that Antibiotic from 9/9 appointment has given patient Yeast infection and that this has happened to get in the past    No discharge noted as patient got period yesterday   Symptoms are lower abdominal pain and burning on vaginal area with urination    Patient would like script sent to pended pharmacy but would like oral due to not being able to use cream while on period    Ricardo Light RN      Reason for Disposition    Caller has NON-URGENT question and triager unable to answer question    Additional Information    Negative: Taking antibiotic and new-onset of fever    Negative: Taking antibiotic > 48 hours (2 days) and fever still present (SAME)    Negative: Taking antibiotic > 72 hours (3 days) and symptoms (other than fever) not improved    Negative: Finished taking antibiotics and symptoms are BETTER but are not completely gone    Negative: Patient wants to be seen    Negative: MODERATE difficulty breathing (e.g., speaks in phrases, SOB even at rest, pulse 100-120)    Negative: Fever > 103 F  (39.4 C)    Negative: Patient sounds very sick or weak to the triager    Negative: Sinus infection and taking an antibiotic    Negative: Wound infection and taking an antibiotic    Negative: SEVERE difficulty breathing (e.g., struggling for each breath, speaks in single words)    Negative: Sounds like a life-threatening emergency to the triager    Protocols used: INFECTION ON ANTIBIOTIC FOLLOW-UP CALL-A-OH

## 2022-09-15 NOTE — TELEPHONE ENCOUNTER
----- Message from Therese Stewart MD sent at 9/12/2022  5:39 PM CDT -----  Your Cholesterol remaining high. Given your age and no cardiac risk factors , recommend dietery management of avoiding high fat foods and red meat . Life style measures on weight reduction recommended.     All your other labs SO FAR are normal, you may see some highlighted which do not have Clinical significance.Few pending labs till awaited , will update    Please let me know if you have any questions.  Therese Stewart MD on 9/12/2022 at 5:37 PM  [facility]

## 2022-09-15 NOTE — TELEPHONE ENCOUNTER
Spoke with patient and she was told the RX is at the pharmacy. She has to  a blood pressure home machine to monitor B/P at home.     She did not have time to complete th PHQ 9 assessment. Will send the  questionnaire in my chart.     Juhi Mendiola RN  [facility]

## 2022-10-12 DIAGNOSIS — K21.9 GASTROESOPHAGEAL REFLUX DISEASE, UNSPECIFIED WHETHER ESOPHAGITIS PRESENT: ICD-10-CM

## 2022-11-12 ENCOUNTER — NURSE TRIAGE (OUTPATIENT)
Dept: NURSING | Facility: CLINIC | Age: 42
End: 2022-11-12

## 2022-11-12 NOTE — TELEPHONE ENCOUNTER
Patient is complaining of urinary symptoms and is taking a probiotic and cranberry    Patient is complaining of pain with urination, urgency, and frequency.   Patient denies any fever, but is having low back pain    Symptoms started four days ago  Triage guidelines recommend to see HCP (or pcp triage) within 4 hours  Caller verbalized and understands directives  Nurse Triage SBAR    Is this a 2nd Level Triage? YES, LICENSED PRACTITIONER REVIEW IS REQUIRED    Situation:   Patient is complaining of urinary symptoms and is taking a probiotic and cranberry    Patient is complaining of pain with urination, urgency, and frequency.   Patient denies any fever, but is having low back pain    Background:   Symptoms started four days ago    Assessment:   patient needs examination    Protocol Recommended Disposition:   See HCP Within 4 Hours (Or PCP Triage)    Recommendation:   wait for providers response  Provider Recommendation Follow Up:   Reached patient/caregiver. Informed of provider's recommendations. Patient verbalized understanding and does not agree with the plan.         Paged to provider  Dr. Lee, patient upset with doctors refusal to prescribe antibiotic for urinary tract infection  Does the patient meet one of the following criteria for ADS visit consideration? 16+ years old, with an [facility] PCP     TIP  Providers, please consider if this condition is appropriate for management at one of our Acute and Diagnostic Services sites.     If patient is a good candidate, please use dotphrase <dot>triageresponse and select Refer to ADS to document.      Reason for Disposition    [1] Pain or burning with passing urine (urination) AND [2] female    Side (flank) or lower back pain present    Additional Information    Negative: Shock suspected (e.g., cold/pale/clammy skin, too weak to stand, low BP, rapid pulse)    Negative: Sounds like a life-threatening emergency to the triager    Negative: Followed a female genital area  injury (e.g., vagina, vulva)    Negative: Followed a male genital area injury (e.g., penis, scrotum)    Negative: Vaginal discharge    Negative: Shock suspected (e.g., cold/pale/clammy skin, too weak to stand, low BP, rapid pulse)    Negative: Sounds like a life-threatening emergency to the triager    Negative: Followed a genital area injury (e.g., vagina, vulva)    Negative: Taking antibiotic for urinary tract infection (UTI)    Negative: Pregnant    Negative: Postpartum (from 0 to 6 weeks after delivery)    Negative: [1] Unable to urinate (or only a few drops) > 4 hours AND [2] bladder feels very full (e.g., palpable bladder or strong urge to urinate)    Negative: Vomiting    Negative: Patient sounds very sick or weak to the triager    Negative: [1] SEVERE pain with urination (e.g., excruciating) AND [2] not improved after 2 hours of pain medicine and Sitz bath    Negative: Fever > 100.4 F (38.0 C)    Protocols used: URINARY SYMPTOMS-A-AH, URINATION PAIN - FEMALE-A-AH

## 2022-11-13 ENCOUNTER — E-VISIT (OUTPATIENT)
Dept: URGENT CARE | Facility: CLINIC | Age: 42
End: 2022-11-13
Payer: COMMERCIAL

## 2022-11-13 DIAGNOSIS — N39.0 ACUTE UTI (URINARY TRACT INFECTION): Primary | ICD-10-CM

## 2022-11-13 PROCEDURE — 99421 OL DIG E/M SVC 5-10 MIN: CPT | Performed by: NURSE PRACTITIONER

## 2022-11-13 RX ORDER — NITROFURANTOIN 25; 75 MG/1; MG/1
100 CAPSULE ORAL 2 TIMES DAILY
Qty: 10 CAPSULE | Refills: 0 | Status: SHIPPED | OUTPATIENT
Start: 2022-11-13 | End: 2022-11-18

## 2022-11-13 NOTE — PATIENT INSTRUCTIONS
Dear Yun Marques    After reviewing your responses, I've been able to diagnose you with a urinary tract infection, which is a common infection of the bladder with bacteria.  This is not a sexually transmitted infection, though urinating immediately after intercourse can help prevent infections.  Drinking lots of fluids is also helpful to clear your current infection and prevent the next one.      I have sent a prescription for antibiotics to your pharmacy to treat this infection.    It is important that you take all of your prescribed medication even if your symptoms are improving after a few doses.  Taking all of your medicine helps prevent the symptoms from returning.     If your symptoms worsen, you develop pain in your back or stomach, develop fevers, or are not improving in 5 days, please contact your primary care provider for an appointment or visit any of our convenient Walk-in or Urgent Care Centers to be seen, which can be found on our website here.    Thanks again for choosing us as your health care partner,    ANGELA Bernstein CNP    Urinary Tract Infections in Women  Urinary tract infections (UTIs) are most often caused by bacteria. These bacteria enter the urinary tract. The bacteria may come from inside the body. Or they may travel from the skin outside the rectum or vagina into the urethra. Female anatomy makes it easy for bacteria from the bowel to enter a woman s urinary tract, which is the most common source of UTI. This means women develop UTIs more often than men. Pain in or around the urinary tract is a common UTI symptom. But the only way to know for sure if you have a UTI for the healthcare provider to test your urine. The two tests that may be done are the urinalysis and urine culture.     Types of UTIs    Cystitis. A bladder infection (cystitis) is the most common UTI in women. You may have urgent or frequent need to pee. You may also have pain, burning when you pee, and  bloody urine.    Urethritis. This is an inflamed urethra, which is the tube that carries urine from the bladder to outside the body. You may have lower stomach or back pain. You may also have urgent or frequent need to pee.    Pyelonephritis. This is a kidney infection. If not treated, it can be serious and damage your kidneys. In severe cases, you may need to stay in the hospital. You may have a fever and lower back pain.    Medicines to treat a UTI  Most UTIs are treated with antibiotics. These kill the bacteria. The length of time you need to take them depends on the type of infection. It may be as short as 3 days. If you have repeated UTIs, you may need a low-dose antibiotic for several months. Take antibiotics exactly as directed. Don t stop taking them until all of the medicine is gone. If you stop taking the antibiotic too soon, the infection may not go away. You may also develop a resistance to the antibiotic. This can make it much harder to treat.   Lifestyle changes to treat and prevent UTIs   The lifestyle changes below will help get rid of your UTI. They may also help prevent future UTIs.     Drink plenty of fluids. This includes water, juice, or other caffeine-free drinks. Fluids help flush bacteria out of your body.    Empty your bladder. Always empty your bladder when you feel the urge to pee. And always pee before going to sleep. Urine that stays in your bladder can lead to infection. Try to pee before and after sex as well.    Practice good personal hygiene. Wipe yourself from front to back after using the toilet. This helps keep bacteria from getting into the urethra.    Use condoms during sex. These help prevent UTIs caused by sexually transmitted bacteria. Also don't use spermicides during sex. These can increase the risk for UTIs. Choose other forms of birth control instead. For women who tend to get UTIs after sex, a low-dose of a preventive antibiotic may be used. Be sure to discuss this  option with your healthcare provider.    Follow up with your healthcare provider as directed. He or she may test to make sure the infection has cleared. If needed, more treatment may be started.  Jyoti last reviewed this educational content on 7/1/2019 2000-2021 [employer]. All rights reserved. This information is not intended as a substitute for professional medical care. Always follow your healthcare professional's instructions.

## 2022-11-29 ENCOUNTER — VIRTUAL VISIT (OUTPATIENT)
Dept: FAMILY MEDICINE | Facility: CLINIC | Age: 42
End: 2022-11-29
Payer: COMMERCIAL

## 2022-11-29 DIAGNOSIS — R05.3 CHRONIC COUGH: Primary | ICD-10-CM

## 2022-11-29 DIAGNOSIS — Z01.83 BLOOD TYPING ENCOUNTER: ICD-10-CM

## 2022-11-29 DIAGNOSIS — J98.01 COLD INDUCED BRONCHOSPASM: ICD-10-CM

## 2022-11-29 PROBLEM — J01.90 ACUTE SINUSITIS WITH SYMPTOMS > 10 DAYS: Status: RESOLVED | Noted: 2022-09-09 | Resolved: 2022-11-29

## 2022-11-29 PROBLEM — Z87.891 HISTORY OF TOBACCO ABUSE: Status: ACTIVE | Noted: 2019-09-11

## 2022-11-29 PROCEDURE — 99213 OFFICE O/P EST LOW 20 MIN: CPT | Mod: 95 | Performed by: FAMILY MEDICINE

## 2022-11-29 RX ORDER — FLUTICASONE PROPIONATE 100 UG/1
2 POWDER, METERED RESPIRATORY (INHALATION) 2 TIMES DAILY
Qty: 60 EACH | Refills: 3 | Status: SHIPPED | OUTPATIENT
Start: 2022-11-29 | End: 2022-12-08

## 2022-11-29 RX ORDER — ALBUTEROL SULFATE 90 UG/1
2 AEROSOL, METERED RESPIRATORY (INHALATION) EVERY 6 HOURS
Qty: 18 G | Refills: 0 | Status: SHIPPED | OUTPATIENT
Start: 2022-11-29 | End: 2023-01-21

## 2022-11-29 RX ORDER — BENZONATATE 100 MG/1
100 CAPSULE ORAL 3 TIMES DAILY PRN
Qty: 20 CAPSULE | Refills: 0 | Status: SHIPPED | OUTPATIENT
Start: 2022-11-29 | End: 2022-12-08

## 2022-11-29 RX ORDER — FLUTICASONE PROPIONATE 100 UG/1
1 POWDER, METERED RESPIRATORY (INHALATION) 2 TIMES DAILY
COMMUNITY
Start: 2021-08-23 | End: 2022-11-29

## 2022-11-29 RX ORDER — LACTOBACILLUS RHAMNOSUS GG 10B CELL
CAPSULE ORAL
COMMUNITY
Start: 2020-12-28

## 2022-11-29 RX ORDER — FLUTICASONE PROPIONATE 50 MCG
SPRAY, SUSPENSION (ML) NASAL
COMMUNITY
Start: 2021-12-30

## 2022-11-29 ASSESSMENT — ANXIETY QUESTIONNAIRES
IF YOU CHECKED OFF ANY PROBLEMS ON THIS QUESTIONNAIRE, HOW DIFFICULT HAVE THESE PROBLEMS MADE IT FOR YOU TO DO YOUR WORK, TAKE CARE OF THINGS AT HOME, OR GET ALONG WITH OTHER PEOPLE: NOT DIFFICULT AT ALL
GAD7 TOTAL SCORE: 3
7. FEELING AFRAID AS IF SOMETHING AWFUL MIGHT HAPPEN: NOT AT ALL
8. IF YOU CHECKED OFF ANY PROBLEMS, HOW DIFFICULT HAVE THESE MADE IT FOR YOU TO DO YOUR WORK, TAKE CARE OF THINGS AT HOME, OR GET ALONG WITH OTHER PEOPLE?: NOT DIFFICULT AT ALL
GAD7 TOTAL SCORE: 3
5. BEING SO RESTLESS THAT IT IS HARD TO SIT STILL: NOT AT ALL
3. WORRYING TOO MUCH ABOUT DIFFERENT THINGS: SEVERAL DAYS
2. NOT BEING ABLE TO STOP OR CONTROL WORRYING: SEVERAL DAYS
1. FEELING NERVOUS, ANXIOUS, OR ON EDGE: NOT AT ALL
GAD7 TOTAL SCORE: 3
6. BECOMING EASILY ANNOYED OR IRRITABLE: SEVERAL DAYS
4. TROUBLE RELAXING: NOT AT ALL
7. FEELING AFRAID AS IF SOMETHING AWFUL MIGHT HAPPEN: NOT AT ALL

## 2022-11-29 ASSESSMENT — PATIENT HEALTH QUESTIONNAIRE - PHQ9
SUM OF ALL RESPONSES TO PHQ QUESTIONS 1-9: 4
10. IF YOU CHECKED OFF ANY PROBLEMS, HOW DIFFICULT HAVE THESE PROBLEMS MADE IT FOR YOU TO DO YOUR WORK, TAKE CARE OF THINGS AT HOME, OR GET ALONG WITH OTHER PEOPLE: SOMEWHAT DIFFICULT
SUM OF ALL RESPONSES TO PHQ QUESTIONS 1-9: 4

## 2022-11-29 NOTE — PROGRESS NOTES
Yun is a 42 year old who is being evaluated via a billable video visit.      How would you like to obtain your AVS? MyChart  If the video visit is dropped, the invitation should be resent by: Text to cell phone: [phone]  Will anyone else be joining your video visit? No      Assessment & Plan     (R05.3) Chronic cough  (primary encounter diagnosis)  Comment:suspect post viral cough, aggravated by chronic bronchospasm  Humidifier, lots of warm liquids  Plan: [place] Resp Allergen Panel, benzonatate         (TESSALON) 100 MG capsule            (J98.01) Cold induced bronchospasm  Comment: see above  Plan: albuterol (PROAIR HFA/PROVENTIL HFA/VENTOLIN         HFA) 108 (90 Base) MCG/ACT inhaler, fluticasone        (FLOVENT DISKUS) 100 MCG/ACT inhaler            (Z01.83) Blood typing encounter  Comment: she's interested in knowing her blood type  Plan: ABO and Rh        She is aware that insurance doesn't cover this lab      No follow-ups on file.   Follow-up Visit   Expected date: Nov 30, 2022      Follow Up Appointment Details:     Follow-up with whom?: Other Primary Care Services    Follow-Up for what?: Lab Visit                    Isabela Gregory MD  [facility]   Yun is a 42 year old presenting for the following health issues:  RSV (Has RSV and still has a cough - cough has been going for about 2 weeks )      History of Present Illness       Back Pain:  She presents for follow up of back pain. Patient's back pain is a recurring problem.  Location of back pain:  Left lower back  Description of back pain: cramping  Back pain spreads: nowhere    Since patient first noticed back pain, pain is: no longer a problem  Does back pain interfere with her job:  No      Headaches:   Since the patient's last clinic visit, headaches are: worsened  The patient is getting headaches:  2-3 week  She is able to do normal daily activities when she has a migraine.  The patient is  "taking the following rescue/relief medications:  No rescue/relief medications   Patient states \"I get total relief\" from the rescue/relief medications.   The patient is taking the following medications to prevent migraines:  Other  In the past 4 weeks, the patient has gone to an Urgent Care or Emergency Room 0 times times due to headaches.    Reason for visit:  Cold/coughing  Symptom onset:  1-2 weeks ago  Symptoms include:  Dizziness/coughing  Symptom intensity:  Moderate  Symptom progression:  Improving  Had these symptoms before:  No  What makes it worse:  No  What makes it better:  Medication    She eats 2-3 servings of fruits and vegetables daily.She consumes 0 sweetened beverage(s) daily.She exercises with enough effort to increase her heart rate 9 or less minutes per day.  She exercises with enough effort to increase her heart rate 3 or less days per week.   She is taking medications regularly.    Today's PHQ-9         PHQ-9 Total Score: 4    PHQ-9 Q9 Thoughts of better off dead/self-harm past 2 weeks :   Not at all    How difficult have these problems made it for you to do your work, take care of things at home, or get along with other people: Somewhat difficult  Today's ADALI-7 Score: 3     URI symptoms  Yun reports 2 weeks of cough, she had a subjective fever at onset of symptoms, lasted about 24 hours. She was negative for covid that day. She hasn't retested.  She is overall feeling better but reports persistent dry cough \"bronchial cough\" which is not usual for her. She doesn't feel that she had flu.  She's using nasonex right now.    She does have seasonal allergies, thinks pollen and ragweed,  responds to regular Nasonex and Claritin.    She wants something to just stop the coughing.      Review of Systems   Constitutional, HEENT, cardiovascular, pulmonary, GI, , musculoskeletal, neuro, skin, endocrine and psych systems are negative, except as otherwise noted.      Objective           Vitals:  No " vitals were obtained today due to virtual visit.    Physical Exam   GENERAL: Healthy, alert and no distress  EYES: Eyes grossly normal to inspection.  No discharge or erythema, or obvious scleral/conjunctival abnormalities.  RESP: No audible wheeze, cough, or visible cyanosis.  No visible retractions or increased work of breathing.    SKIN: Visible skin clear. No significant rash, abnormal pigmentation or lesions.  NEURO: Cranial nerves grossly intact.  Mentation and speech appropriate for age.  PSYCH: Mentation appears normal, affect normal/bright, judgement and insight intact, normal speech and appearance well-groomed.              Video-Visit Details    Video Start Time: 1:30 PM    Type of service:  Video Visit    Video End Time:1:48 PM    Originating Location (pt. Location): Home    Distant Location (provider location):  Off-site    Platform used for Video Visit: [employer]

## 2022-12-08 ENCOUNTER — VIRTUAL VISIT (OUTPATIENT)
Dept: FAMILY MEDICINE | Facility: CLINIC | Age: 42
End: 2022-12-08
Payer: COMMERCIAL

## 2022-12-08 DIAGNOSIS — R35.0 INCREASED FREQUENCY OF URINATION: ICD-10-CM

## 2022-12-08 DIAGNOSIS — N89.8 VAGINAL DISCHARGE: ICD-10-CM

## 2022-12-08 DIAGNOSIS — J45.40 MODERATE PERSISTENT ASTHMA WITHOUT COMPLICATION: Primary | ICD-10-CM

## 2022-12-08 PROCEDURE — 99214 OFFICE O/P EST MOD 30 MIN: CPT | Mod: 95 | Performed by: INTERNAL MEDICINE

## 2022-12-08 RX ORDER — FLUTICASONE PROPIONATE AND SALMETEROL 250; 50 UG/1; UG/1
1 POWDER RESPIRATORY (INHALATION) EVERY 12 HOURS
Qty: 60 EACH | Refills: 1 | Status: SHIPPED | OUTPATIENT
Start: 2022-12-08

## 2022-12-08 NOTE — PROGRESS NOTES
Yun is a 42 year old who is being evaluated via a billable video visit.      How would you like to obtain your AVS? MyChart  If the video visit is dropped, the invitation should be resent by: Text to cell phone: [phone]  Will anyone else be joining your video visit? No      Assessment and Plan  1. Moderate persistent asthma without complication  Ongoing problem of bronchospasm uncontrolled.  Recent provider visit virtually at [facility],Currently taking as needed 3 x/ daily and has been facing for last 10 days. Has been also on Flovent 100 mcg  BID whichis not helping much since last 10 days.  Did have recent asthma exacerbation with antibiotic and steroid usage.  Per my video exam I do not think she is in exacerbation at this time, will step up therapy of asthma at this time.  -We will change Flovent to Advair discus which patient understood and agreed with the plan.  - fluticasone-salmeterol (ADVAIR) 250-50 MCG/ACT inhaler; Inhale 1 puff into the lungs every 12 hours  Dispense: 60 each; Refill: 1    2. Vaginal discharge  - Wet prep - lab collect; Future    3. Increased frequency of urination  - UA with Microscopic reflex to Culture - lab collect; Future       Patient Instructions   As discussed , I have changed your scheduled inhaler from Flovent to Advair diskus for improvement of your symptoms of Ongoing Bronchospasm which is considered as most likely Seasonal Asthma     Please do the lab only appointment for urine exam and vaginal swab for further recommendations on treatment of this concerns.        Return in about 3 months (around 3/8/2023), or if symptoms worsen or fail to improve, for Preventative Visit.    Therese Stewart MD  [facility] [place]        Tiffani Malcolm is a 42 year old, presenting for the following health issues:  Follow Up      HPI     Patient is here regarding follow up on:    Asthma Cough  - still persistent    Potential Yeast infection due to  medication previously on       Vaginal Symptoms  Onset/Duration:   Description:  Vaginal Discharge: white   Itching (Pruritis): YES  Burning sensation:  YES  Odor: No  Accompanying Signs & Symptoms:  Urinary symptoms: increased frequency  Abdominal pain: YES  Fever: No  History:   Sexually active: YES  New Partner: No  Possibility of Pregnancy:  No  Recent antibiotic use: YES  Previous vaginitis issues: YES  Precipitating or alleviating factors: None  Therapies tried and outcome: none and probiotics with no relief.       Allergies   Allergen Reactions     Sulfa Drugs Hives     HAD SEVERE REACTION- SWELLING BREATHING PROBS, WAS HOSPITALIZED  PN: LW Reaction: hives/ swelling        Past Medical History:   Diagnosis Date     Acute sinusitis with symptoms > 10 days 2022     Current moderate episode of major depressive disorder without prior episode (H)      Uncomplicated asthma        History reviewed. No pertinent surgical history.    History reviewed. No pertinent family history.    Social History     Tobacco Use     Smoking status: Former     Years: 10.00     Types: Cigarettes     Start date: 2000     Quit date: 2017     Years since quittin.9     Smokeless tobacco: Never     Tobacco comments:     1-2 cigarettes per month(socially) - 09/26/15: hasn't smoked in months   Substance Use Topics     Alcohol use: Yes        Current Outpatient Medications   Medication     albuterol (PROAIR HFA/PROVENTIL HFA/VENTOLIN HFA) 108 (90 Base) MCG/ACT inhaler     Blood Pressure KIT     fluticasone (FLONASE) 50 MCG/ACT nasal spray     fluticasone (FLONASE) 50 MCG/ACT nasal spray     fluticasone-salmeterol (ADVAIR) 250-50 MCG/ACT inhaler     Lactobacillus Rhamnosus, GG, (OTF BAILEY) CHEW     loratadine-pseudoePHEDrine (CLARITIN-D 12-HOUR) 5-120 MG 12 hr tablet     omeprazole (PRILOSEC) 20 MG DR capsule     sertraline (ZOLOFT) 25 MG tablet     simethicone (MYLICON) 80 MG chewable tablet     tiZANidine (ZANAFLEX) 4 MG  tablet     No current facility-administered medications for this visit.        Review of Systems   Constitutional, HEENT, cardiovascular, pulmonary, GI, , musculoskeletal, neuro, skin, endocrine and psych systems are negative, except as otherwise noted.      Objective           Vitals:  No vitals were obtained today due to virtual visit.    Physical Exam   GENERAL: Healthy, alert and no distress  EYES: Eyes grossly normal to inspection.  No discharge or erythema, or obvious scleral/conjunctival abnormalities.  RESP: No audible wheeze, cough, or visible cyanosis.  No visible retractions or increased work of breathing.    SKIN: Visible skin clear. No significant rash, abnormal pigmentation or lesions.  NEURO: Cranial nerves grossly intact.  Mentation and speech appropriate for age.  PSYCH: Mentation appears normal, affect normal/bright, judgement and insight intact, normal speech and appearance well-groomed.      Video-Visit Details    Video Start Time: Joined the call at 12/8/2022, 4:50:53 pm.    Type of service:  Video Visit    Video End Time:Left the call at 12/8/2022, 5:01:49 pm.    Originating Location (pt. Location): Home    Distant Location (provider location):  On-site    Platform used for Video Visit: [employer]

## 2022-12-08 NOTE — PATIENT INSTRUCTIONS
As discussed , I have changed your scheduled inhaler from Flovent to Advair diskus for improvement of your symptoms of Ongoing Bronchospasm which is considered as most likely Seasonal Asthma     Please do the lab only appointment for urine exam and vaginal swab for further recommendations on treatment of this concerns.

## 2022-12-09 ENCOUNTER — LAB (OUTPATIENT)
Dept: LAB | Facility: CLINIC | Age: 42
End: 2022-12-09
Payer: COMMERCIAL

## 2022-12-09 DIAGNOSIS — R35.0 INCREASED FREQUENCY OF URINATION: ICD-10-CM

## 2022-12-09 DIAGNOSIS — Z01.83 BLOOD TYPING ENCOUNTER: ICD-10-CM

## 2022-12-09 DIAGNOSIS — Z13.21 ENCOUNTER FOR VITAMIN DEFICIENCY SCREENING: ICD-10-CM

## 2022-12-09 DIAGNOSIS — N89.8 VAGINAL DISCHARGE: ICD-10-CM

## 2022-12-09 DIAGNOSIS — R10.819 SUPRAPUBIC TENDERNESS: ICD-10-CM

## 2022-12-09 DIAGNOSIS — R05.3 CHRONIC COUGH: ICD-10-CM

## 2022-12-09 LAB
ABO/RH(D): NORMAL
ALBUMIN UR-MCNC: NEGATIVE MG/DL
APPEARANCE UR: CLEAR
BACTERIA #/AREA URNS HPF: ABNORMAL /HPF
BILIRUB UR QL STRIP: NEGATIVE
CLUE CELLS: NORMAL
COLOR UR AUTO: YELLOW
DEPRECATED CALCIDIOL+CALCIFEROL SERPL-MC: 30 UG/L (ref 20–75)
GLUCOSE UR STRIP-MCNC: NEGATIVE MG/DL
HGB UR QL STRIP: NEGATIVE
KETONES UR STRIP-MCNC: NEGATIVE MG/DL
LEUKOCYTE ESTERASE UR QL STRIP: NEGATIVE
NITRATE UR QL: NEGATIVE
PH UR STRIP: 5.5 [PH] (ref 5–7)
RBC #/AREA URNS AUTO: ABNORMAL /HPF
SP GR UR STRIP: <=1.005 (ref 1–1.03)
SPECIMEN EXPIRATION DATE: NORMAL
SQUAMOUS #/AREA URNS AUTO: ABNORMAL /LPF
TRICHOMONAS, WET PREP: NORMAL
UROBILINOGEN UR STRIP-ACNC: 0.2 E.U./DL
WBC #/AREA URNS AUTO: ABNORMAL /HPF
WBC'S/HIGH POWER FIELD, WET PREP: NORMAL
YEAST, WET PREP: NORMAL

## 2022-12-09 PROCEDURE — 81001 URINALYSIS AUTO W/SCOPE: CPT

## 2022-12-09 PROCEDURE — 36415 COLL VENOUS BLD VENIPUNCTURE: CPT

## 2022-12-09 PROCEDURE — 86901 BLOOD TYPING SEROLOGIC RH(D): CPT

## 2022-12-09 PROCEDURE — 87210 SMEAR WET MOUNT SALINE/INK: CPT

## 2022-12-09 PROCEDURE — 86900 BLOOD TYPING SEROLOGIC ABO: CPT

## 2022-12-09 PROCEDURE — 86003 ALLG SPEC IGE CRUDE XTRC EA: CPT | Mod: 59

## 2022-12-09 PROCEDURE — 82785 ASSAY OF IGE: CPT

## 2022-12-09 PROCEDURE — 82306 VITAMIN D 25 HYDROXY: CPT

## 2022-12-14 LAB
A ALTERNATA IGE QN: 0.75 KU(A)/L
A FUMIGATUS IGE QN: <0.1 KU(A)/L
BERMUDA GRASS IGE QN: <0.1 KU(A)/L
C HERBARUM IGE QN: <0.1 KU(A)/L
CAT DANDER IGG QN: <0.1 KU(A)/L
CEDAR IGE QN: <0.1 KU(A)/L
COMMON RAGWEED IGE QN: 0.57 KU(A)/L
COTTONWOOD IGE QN: <0.1 KU(A)/L
D FARINAE IGE QN: <0.1 KU(A)/L
D PTERONYSS IGE QN: <0.1 KU(A)/L
DOG DANDER+EPITH IGE QN: <0.1 KU(A)/L
IGE SERPL-ACNC: 22 KU/L (ref 0–114)
MAPLE IGE QN: <0.1 KU(A)/L
MARSH ELDER IGE QN: <0.1 KU(A)/L
MOUSE URINE PROT IGE QN: <0.1 KU(A)/L
NETTLE IGE QN: <0.1 KU(A)/L
P NOTATUM IGE QN: <0.1 KU(A)/L
ROACH IGE QN: <0.1 KU(A)/L
SALTWORT IGE QN: <0.1 KU(A)/L
SILVER BIRCH IGE QN: <0.1 KU(A)/L
TIMOTHY IGE QN: <0.1 KU(A)/L
WHITE ASH IGE QN: <0.1 KU(A)/L
WHITE ELM IGE QN: <0.1 KU(A)/L
WHITE MULBERRY IGE QN: <0.1 KU(A)/L
WHITE OAK IGE QN: <0.1 KU(A)/L

## 2022-12-25 ENCOUNTER — HEALTH MAINTENANCE LETTER (OUTPATIENT)
Age: 42
End: 2022-12-25

## 2023-01-05 DIAGNOSIS — J45.40 MODERATE PERSISTENT ASTHMA WITHOUT COMPLICATION: Primary | ICD-10-CM

## 2023-01-05 DIAGNOSIS — J30.89 ALLERGIC RHINITIS CAUSED BY MOLD: ICD-10-CM

## 2023-01-06 NOTE — RESULT ENCOUNTER NOTE
Happy New Year! Your allergy panel does show a moderate mold allergy and a low level ragweed allergy (typical symptoms occur in the late summer/early fall).     I recommend trying to make sure you try to minimize moisture in your home to reduce mold risk. Basements are particularly prone to mold problems.  A mold allergy would cause symptoms year around if there is any mold in the home.    Please let me know if you'd like an allergy referral.    Sincerely,  Dr. Isabela Gregory MD  1/5/2023

## 2023-01-07 ENCOUNTER — NURSE TRIAGE (OUTPATIENT)
Dept: NURSING | Facility: CLINIC | Age: 43
End: 2023-01-07

## 2023-01-08 NOTE — TELEPHONE ENCOUNTER
C/o dysuria and urinary frequency for 3 days. Also some itching in perineal area. C/o lower back pan. Temp unknown - no thermometer. Triaged to see provider w/i 4 hrs. UC closed until tomorrow AM. No 2LT as pt states she will not go to ED under due to cost. Provider will not tx complicated UTI by phone (back or flank pain, fever or hematuria) Pt will go to UC in the morning.     Reason for Disposition    Side (flank) or lower back pain present    Additional Information    Negative: Shock suspected (e.g., cold/pale/clammy skin, too weak to stand, low BP, rapid pulse)    Negative: Sounds like a life-threatening emergency to the triager    Negative: Followed a genital area injury (e.g., vagina, vulva)    Negative: Taking antibiotic for urinary tract infection (UTI)    Negative: Pregnant    Negative: Postpartum (from 0 to 6 weeks after delivery)    Negative: [1] Unable to urinate (or only a few drops) > 4 hours AND [2] bladder feels very full (e.g., palpable bladder or strong urge to urinate)    Negative: Vomiting    Negative: Patient sounds very sick or weak to the triager    Negative: [1] SEVERE pain with urination (e.g., excruciating) AND [2] not improved after 2 hours of pain medicine and Sitz bath    Commented on: Fever > 100.4 F (38.0 C)     No thermometer    Protocols used: URINATION PAIN - FEMALE-A-

## 2023-02-26 ENCOUNTER — OFFICE VISIT (OUTPATIENT)
Dept: URGENT CARE | Facility: URGENT CARE | Age: 43
End: 2023-02-26
Payer: COMMERCIAL

## 2023-02-26 VITALS
WEIGHT: 169 LBS | DIASTOLIC BLOOD PRESSURE: 88 MMHG | HEART RATE: 89 BPM | TEMPERATURE: 98.5 F | OXYGEN SATURATION: 99 % | SYSTOLIC BLOOD PRESSURE: 148 MMHG | RESPIRATION RATE: 20 BRPM | BODY MASS INDEX: 26.47 KG/M2

## 2023-02-26 DIAGNOSIS — N89.8 VAGINAL DISCHARGE: ICD-10-CM

## 2023-02-26 DIAGNOSIS — R03.0 ELEVATED BLOOD PRESSURE READING WITHOUT DIAGNOSIS OF HYPERTENSION: ICD-10-CM

## 2023-02-26 DIAGNOSIS — B96.89 BV (BACTERIAL VAGINOSIS): ICD-10-CM

## 2023-02-26 DIAGNOSIS — N76.0 BV (BACTERIAL VAGINOSIS): ICD-10-CM

## 2023-02-26 DIAGNOSIS — R30.0 DYSURIA: Primary | ICD-10-CM

## 2023-02-26 LAB
ALBUMIN UR-MCNC: NEGATIVE MG/DL
APPEARANCE UR: CLEAR
BILIRUB UR QL STRIP: NEGATIVE
CLUE CELLS: PRESENT
COLOR UR AUTO: YELLOW
GLUCOSE UR STRIP-MCNC: NEGATIVE MG/DL
HGB UR QL STRIP: NEGATIVE
KETONES UR STRIP-MCNC: NEGATIVE MG/DL
LEUKOCYTE ESTERASE UR QL STRIP: NEGATIVE
NITRATE UR QL: NEGATIVE
PH UR STRIP: 6.5 [PH] (ref 5–7)
SP GR UR STRIP: <=1.005 (ref 1–1.03)
TRICHOMONAS, WET PREP: ABNORMAL
UROBILINOGEN UR STRIP-ACNC: 0.2 E.U./DL
WBC'S/HIGH POWER FIELD, WET PREP: ABNORMAL
YEAST, WET PREP: ABNORMAL

## 2023-02-26 PROCEDURE — 87210 SMEAR WET MOUNT SALINE/INK: CPT | Performed by: FAMILY MEDICINE

## 2023-02-26 PROCEDURE — 99214 OFFICE O/P EST MOD 30 MIN: CPT | Performed by: FAMILY MEDICINE

## 2023-02-26 PROCEDURE — 81003 URINALYSIS AUTO W/O SCOPE: CPT | Performed by: FAMILY MEDICINE

## 2023-02-26 RX ORDER — METRONIDAZOLE 500 MG/1
500 TABLET ORAL 2 TIMES DAILY
Qty: 14 TABLET | Refills: 0 | Status: SHIPPED | OUTPATIENT
Start: 2023-02-26 | End: 2023-03-05

## 2023-02-26 NOTE — PROGRESS NOTES
SUBJECTIVE: Yun Marques is a 42 year old female presenting with a chief complaint of vag dc.  Onset of symptoms was day(s) ago.      Past Medical History:   Diagnosis Date     Acute sinusitis with symptoms > 10 days 2022     Current moderate episode of major depressive disorder without prior episode (H)      Uncomplicated asthma      Allergies   Allergen Reactions     Sulfa Drugs Hives     HAD SEVERE REACTION- SWELLING BREATHING PROBS, WAS HOSPITALIZED  PN: LW Reaction: hives/ swelling     Social History     Tobacco Use     Smoking status: Former     Years: 10.00     Types: Cigarettes     Start date: 2000     Quit date: 2017     Years since quittin.1     Smokeless tobacco: Never     Tobacco comments:     1-2 cigarettes per month(socially) - 09/26/15: hasn't smoked in months   Substance Use Topics     Alcohol use: Yes       ROS:  SKIN: no rash  GI: no vomiting    OBJECTIVE:  BP (!) 148/88   Pulse 89   Temp 98.5  F (36.9  C)   Resp 20   Wt 76.7 kg (169 lb)   SpO2 99%   BMI 26.47 kg/m  GENERAL APPEARANCE: healthy, alert and no distress  ABDOMEN:  soft  SKIN: no suspicious lesions or rashes      ICD-10-CM    1. Dysuria  R30.0 UA Macro with Reflex to Micro and Culture - lab collect      2. Vaginal discharge  N89.8 Wet prep - Clinic Collect      3. BV (bacterial vaginosis)  N76.0 metroNIDAZOLE (FLAGYL) 500 MG tablet    B96.89       4. Elevated blood pressure reading without diagnosis of hypertension  R03.0         Recheck BP  Fluids/Rest, f/u if worse/not any better

## 2023-03-05 ENCOUNTER — NURSE TRIAGE (OUTPATIENT)
Dept: NURSING | Facility: CLINIC | Age: 43
End: 2023-03-05
Payer: COMMERCIAL

## 2023-03-05 ENCOUNTER — MYC MEDICAL ADVICE (OUTPATIENT)
Dept: URGENT CARE | Facility: URGENT CARE | Age: 43
End: 2023-03-05
Payer: COMMERCIAL

## 2023-03-05 DIAGNOSIS — N76.0 VAGINITIS AND VULVOVAGINITIS: Primary | ICD-10-CM

## 2023-03-05 RX ORDER — FLUCONAZOLE 150 MG/1
150 TABLET ORAL ONCE
Qty: 1 TABLET | Refills: 0 | Status: SHIPPED | OUTPATIENT
Start: 2023-03-05 | End: 2023-03-05

## 2023-03-05 NOTE — TELEPHONE ENCOUNTER
Pt calling in to triage today after being seen 1 week ago in  by provider Dr. Hogan. Pt was treated for BV at that time with Rx medication;  metroNIDAZOLE (FLAGYL) 500 MG tablet    S/sx of the BV have since resolved.  Now has finished medication and has over correction of vaginal melecio creating a yeast infection.  Is wondering if MD would be willing to send PO pill   OTC medications do not seem to work for her s/sx as suggested by RN  Pt requesting contact of original provider.  RN will queue up 1 time medication for provider to sign if he is willing to send this.  If not Pt is advised to contact primary care during clinic hours.  RX preferred pharmacy;      [id number] - [place], [place] - [address] AT [address] [place] [address]    Verbalizes agreement and understanding     Lynda De Luna, RN, [place], PHN on 3/5/2023 at 11:13 AM  [facility]  RN utilized sound nursing judgement based on facility triage protocols during this encounter.      Reason for Disposition    Symptoms of a vaginal yeast infection (i.e., white, thick, cottage-cheese-like, itchy, not bad smelling discharge)    Additional Information    Negative: SEVERE difficulty breathing (e.g., struggling for each breath, speaks in single words)    Negative: Sounds like a life-threatening emergency to the triager    Negative: [1] Recent hospitalization for pneumonia AND [2] taking an antibiotic    Negative: [1] Animal bite infection AND [2] taking an antibiotic    Negative: [1] Cellulitis AND [2] taking an antibiotic    Negative: [1] Ear  infection (Otitis Media) AND [2] taking an antibiotic    Negative: [1] Sinus infection AND [2] taking an antibiotic    Negative: [1] Strep throat AND [2] taking an antibiotic    Negative: [1] Urinary tract  infection (e.g., cystitis, pyelonephritis, urethritis, epididymitis) AND [2] male AND [3] taking an antibiotic    Negative: [1] Ear  infection (Swimmer's Ear) AND [2] taking an  antibiotic    Negative: [1] Urinary tract  infection (e.g., cystitis, pyelonephritis, urethritis) AND [2] female AND [3] taking an antibiotic    Negative: [1] Wound infection AND [2] taking an antibiotic    Negative: MODERATE difficulty breathing (e.g., speaks in phrases, SOB even at rest, pulse 100-120)    Negative: Fever > 103 F (39.4 C)    Negative: Patient sounds very sick or weak to the triager    Negative: [1] Taking antibiotics > 24 hours AND [2] symptoms WORSE    Negative: [1] Recent hospitalization AND [2] symptoms WORSE    Negative: [1] Diabetes mellitus or weak immune system (e.g., HIV positive, cancer chemo, splenectomy, organ transplant, chronic steroids) AND [2] new-onset of fever > 100.0 F (37.8 C)    Negative: [1] Caller has URGENT question AND [2] triager unable to answer question    Negative: [1] Taking antibiotic AND [2] new-onset of fever    Negative: [1] Taking antibiotic > 48 hours (2 days) AND [2] fever still present (SAME)    Negative: [1] Taking antibiotic > 72 hours (3 days) AND [2] symptoms (other than fever) not improved    Negative: [1] Caller has NON-URGENT question AND [2] triager unable to answer question    Negative: [1] Finished taking antibiotics AND [2] symptoms are BETTER but [3] not completely gone    Negative: [1] Taking antibiotic AND [2] symptoms are BETTER AND [3] no fever    Negative: [1] Taking antibiotics < 48 hours AND [2] fever still present (SAME)    Negative: [1] Taking antibiotic < 72 hours (3 days) AND [2] symptoms are SAME (not improved)    Negative: [1] Vaginal bleeding AND [2] pregnant 20 or more weeks    Negative: [1] Vaginal bleeding AND [2] pregnant < 20 weeks    Negative: [1] Having contractions or other symptoms of labor AND [2] < 37 weeks pregnant (i.e., )    Negative: [1] Having contractions or other symptoms of labor AND [2] 37 or more weeks pregnant (i.e., term pregnancy)    Negative: Leakage of fluid (trickle, gush) from the vagina    Negative:  "Foreign body in vagina (e.g., tampon)    Negative: Pain or burning with passing urine (urination) is main symptom    Negative: [1] Pregnant 23 or more weeks AND [2] baby is moving less today (e.g., kick count < 5 in 1 hour or < 10 in 2 hours)    Negative: Patient sounds very sick or weak to the triager    Negative: [1] Constant abdominal pain AND [2] present > 2 hours    Negative: [1] Intermittent lower abdominal pain AND [2] present > 24 hours    Negative: [1] Pregnant 24-36 weeks () AND [2] pinkish or brownish mucous discharge    Negative: [1] Yellow or green vaginal discharge AND [2] fever    Negative: Painful rash with tiny water blisters    Negative: [1] Rash (e.g., redness, tiny bumps, sore) of genital area AND [2] present > 24 hours    Negative: Abnormal color vaginal discharge (i.e., yellow, green, gray)    Negative: Bad smelling vaginal discharge    Negative: Tender lump (swelling or \"ball\") at vaginal opening    Answer Assessment - Initial Assessment Questions  1. INFECTION: \"What infection is the antibiotic being given for?\"      Had BV was seen in UC and now having yeast infection s/sx     2. ANTIBIOTIC: \"What antibiotic are you taking\" \"How many times per day?\"      Metronidazole     3. DURATION: \"When was the antibiotic started?\"       Took for 7 days - yesterday was day 7      4. MAIN CONCERN OR SYMPTOM:  \"What is your main concern right now?\"      Yeast infection s/sx     5. BETTER-SAME-WORSE: \"Are you getting better, staying the same, or getting worse compared to when you first started the antibiotics?\" If getting worse, ask: \"In what way?\"        Better but now got a new issue     6. FEVER: \"Do you have a fever?\" If Yes, ask: \"What is your temperature, how was it measured, and when did it start?\"      No     7. SYMPTOMS: \"Are there any other symptoms you're concerned about?\" If Yes, ask: \"When did it start?\"      Itchy   8. FOLLOW-UP APPOINTMENT: \"Do you have a follow-up appointment with " "your doctor?\"      No    Answer Assessment - Initial Assessment Questions  1. DISCHARGE: \"Describe the discharge.\" (e.g., white, yellow, green, gray, foamy, cottage cheese-like)      White thick     2. ODOR: \"Is there a bad odor?\"      No     3. ONSET: \"When did the discharge begin?\"      Yesterday     4. RASH: \"Is there a rash in that area?\" If Yes, ask: \"Describe it.\" (e.g., redness, blisters, sores, bumps)      No     5. ABDOMINAL PAIN: \"Are you having any abdominal pain?\" If Yes, ask: \"What does it feel like?\" (e.g., crampy, dull, intermittent, constant)       Mild discomfort     6. ABDOMINAL PAIN SEVERITY: If present, ask: \"How bad is it?\"  (e.g., Scale 1-10; mild, moderate, or severe)    - MILD (1-3): doesn't interfere with normal activities, abdomen soft and not tender to touch     - MODERATE (4-7): interferes with normal activities or awakens from sleep, abdomen tender to touch     - SEVERE (8-10): excruciating pain, doubled over, unable to do any normal activities      3/10    7. CAUSE: \"What do you think is causing the discharge?\"      Yeast infection     8. OTHER SYMPTOMS: \"Do you have any other symptoms?\" (e.g., fever, itching, vaginal bleeding, pain with urination)      Itching     9. MISTY: \"What date are you expecting to deliver?\"       No     10. PREGNANCY: \"How many weeks pregnant are you?\"    Protocols used: PREGNANCY - VAGINAL [id number], INFECTION ON ANTIBIOTIC FOLLOW-UP CALL-A-      "

## 2023-03-12 DIAGNOSIS — J98.01 COLD INDUCED BRONCHOSPASM: ICD-10-CM

## 2023-03-12 DIAGNOSIS — M54.50 LEFT-SIDED LOW BACK PAIN WITHOUT SCIATICA, UNSPECIFIED CHRONICITY: ICD-10-CM

## 2023-03-13 RX ORDER — ALBUTEROL SULFATE 90 UG/1
AEROSOL, METERED RESPIRATORY (INHALATION)
Qty: 18 G | Refills: 0 | Status: SHIPPED | OUTPATIENT
Start: 2023-03-13

## 2023-03-13 NOTE — TELEPHONE ENCOUNTER
Refill done. Future refills after physical.    Please call the patient and schedule Annual physical with me, which patient is due at this time, to further take care of the medical conditions and medications.OK to use same day slot / double book near another virtual slot in 3-4 weeks.     Thank you,  Therese Stewart MD on 3/13/2023 at 4:39 PM

## 2023-04-06 ENCOUNTER — OFFICE VISIT (OUTPATIENT)
Dept: URGENT CARE | Facility: URGENT CARE | Age: 43
End: 2023-04-06
Payer: COMMERCIAL

## 2023-04-06 VITALS
HEART RATE: 92 BPM | TEMPERATURE: 99.1 F | DIASTOLIC BLOOD PRESSURE: 101 MMHG | WEIGHT: 177 LBS | BODY MASS INDEX: 27.72 KG/M2 | SYSTOLIC BLOOD PRESSURE: 156 MMHG

## 2023-04-06 DIAGNOSIS — N76.0 BV (BACTERIAL VAGINOSIS): ICD-10-CM

## 2023-04-06 DIAGNOSIS — R03.0 ELEVATED BLOOD PRESSURE READING: ICD-10-CM

## 2023-04-06 DIAGNOSIS — R35.0 URINARY FREQUENCY: ICD-10-CM

## 2023-04-06 DIAGNOSIS — N94.9 VAGINAL SYMPTOM: Primary | ICD-10-CM

## 2023-04-06 DIAGNOSIS — B96.89 BV (BACTERIAL VAGINOSIS): ICD-10-CM

## 2023-04-06 DIAGNOSIS — B37.31 CANDIDIASIS OF VAGINA: ICD-10-CM

## 2023-04-06 LAB
ALBUMIN UR-MCNC: NEGATIVE MG/DL
APPEARANCE UR: CLEAR
BILIRUB UR QL STRIP: NEGATIVE
CLUE CELLS: PRESENT
COLOR UR AUTO: YELLOW
GLUCOSE UR STRIP-MCNC: NEGATIVE MG/DL
HGB UR QL STRIP: NEGATIVE
KETONES UR STRIP-MCNC: NEGATIVE MG/DL
LEUKOCYTE ESTERASE UR QL STRIP: NEGATIVE
NITRATE UR QL: NEGATIVE
PH UR STRIP: 7 [PH] (ref 5–7)
SP GR UR STRIP: 1.02 (ref 1–1.03)
TRICHOMONAS, WET PREP: ABNORMAL
UROBILINOGEN UR STRIP-ACNC: 0.2 E.U./DL
WBC'S/HIGH POWER FIELD, WET PREP: ABNORMAL
YEAST, WET PREP: PRESENT

## 2023-04-06 PROCEDURE — 87210 SMEAR WET MOUNT SALINE/INK: CPT | Performed by: PHYSICIAN ASSISTANT

## 2023-04-06 PROCEDURE — 99213 OFFICE O/P EST LOW 20 MIN: CPT | Performed by: PHYSICIAN ASSISTANT

## 2023-04-06 PROCEDURE — 81003 URINALYSIS AUTO W/O SCOPE: CPT | Performed by: PHYSICIAN ASSISTANT

## 2023-04-06 RX ORDER — FLUCONAZOLE 150 MG/1
150 TABLET ORAL
Qty: 3 TABLET | Refills: 0 | Status: SHIPPED | OUTPATIENT
Start: 2023-04-06 | End: 2023-04-13

## 2023-04-06 RX ORDER — METRONIDAZOLE 500 MG/1
500 TABLET ORAL 2 TIMES DAILY
Qty: 14 TABLET | Refills: 0 | Status: SHIPPED | OUTPATIENT
Start: 2023-04-06 | End: 2023-04-13

## 2023-04-06 NOTE — PROGRESS NOTES
SUBJECTIVE:   Yun Marques is a 43 year old female presenting with a chief complaint of   Chief Complaint   Patient presents with     Vaginal Problem     Discharge x a week    Urinary frequency        She is an established patient of [facility].  Patient presents with urinary frequency, vaginal discharge and itching x a week.  No concerns for STDs.        Review of Systems    Past Medical History:   Diagnosis Date     Acute sinusitis with symptoms > 10 days 9/9/2022     Current moderate episode of major depressive disorder without prior episode (H)      Uncomplicated asthma      No family history on file.  Current Outpatient Medications   Medication Sig Dispense Refill     albuterol (PROAIR HFA/PROVENTIL HFA/VENTOLIN HFA) 108 (90 Base) MCG/ACT inhaler INHALE 2 PUFFS INTO THE LUNGS EVERY 6 HOURS 18 g 0     Blood Pressure KIT Take BP twice daily 1 kit 0     fluconazole (DIFLUCAN) 150 MG tablet Take 1 tablet (150 mg) by mouth every 3 days for 3 doses 3 tablet 0     fluticasone (FLONASE) 50 MCG/ACT nasal spray SHAKE LIQUID AND USE 2 SPRAYS IN EACH NOSTRIL EVERY DAY       fluticasone-salmeterol (ADVAIR) 250-50 MCG/ACT inhaler Inhale 1 puff into the lungs every 12 hours 60 each 1     Lactobacillus Rhamnosus, GG, (CULTURELLE KIDS) CHEW        loratadine-pseudoePHEDrine (CLARITIN-D 12-HOUR) 5-120 MG 12 hr tablet Take 1 tablet by mouth 2 times daily 60 tablet 3     metroNIDAZOLE (FLAGYL) 500 MG tablet Take 1 tablet (500 mg) by mouth 2 times daily for 7 days 14 tablet 0     omeprazole (PRILOSEC) 20 MG DR capsule TAKE 1 CAPSULE(20 MG) BY MOUTH DAILY 90 capsule 3     sertraline (ZOLOFT) 25 MG tablet Take 1 tablet (25 mg) by mouth daily 30 tablet 1     simethicone (MYLICON) 80 MG chewable tablet Take 1 tablet (80 mg) by mouth every 6 hours as needed for flatulence or cramping 30 tablet 1     tiZANidine (ZANAFLEX) 4 MG tablet TAKE 1 TABLET(4 MG) BY MOUTH EVERY NIGHT AS NEEDED FOR MUSCLE SPASMS 30 tablet 0     fluticasone  (FLONASE) 50 MCG/ACT nasal spray  (Patient not taking: Reported on 2023)       Social History     Tobacco Use     Smoking status: Former     Years: 10.00     Types: Cigarettes     Start date: 2000     Quit date: 2017     Years since quittin.2     Smokeless tobacco: Never     Tobacco comments:     1-2 cigarettes per month(socially) - 09/26/15: hasn't smoked in months   Vaping Use     Vaping status: Not on file   Substance Use Topics     Alcohol use: Yes       OBJECTIVE  BP (!) 156/101   Pulse 92   Temp 99.1  F (37.3  C) (Tympanic)   Wt 80.3 kg (177 lb)   BMI 27.72 kg/m      Physical Exam  Vitals and nursing note reviewed.   Constitutional:       General: She is not in acute distress.     Appearance: Normal appearance. She is obese. She is not ill-appearing.   Eyes:      Extraocular Movements: Extraocular movements intact.      Conjunctiva/sclera: Conjunctivae normal.   Cardiovascular:      Rate and Rhythm: Normal rate and regular rhythm.      Pulses: Normal pulses.      Heart sounds: Normal heart sounds.   Pulmonary:      Effort: Pulmonary effort is normal.      Breath sounds: Normal breath sounds.   Abdominal:      Tenderness: There is no right CVA tenderness or left CVA tenderness.   Skin:     General: Skin is warm and dry.   Neurological:      General: No focal deficit present.      Mental Status: She is alert.   Psychiatric:         Mood and Affect: Mood normal.         Behavior: Behavior normal.         Labs:  Results for orders placed or performed in visit on 23 (from the past 24 hour(s))   Wet prep - Clinic Collect    Specimen: Vagina; Swab   Result Value Ref Range    Trichomonas Absent Absent    Yeast Present (A) Absent    Clue Cells Present (A) Absent    WBCs/high power field 2+ (A) None   UA macro with reflex to Microscopic and Culture - Clinc Collect    Specimen: Urine, Clean Catch   Result Value Ref Range    Color Urine Yellow Colorless, Straw, Light Yellow, Yellow    Appearance  Urine Clear Clear    Glucose Urine Negative Negative mg/dL    Bilirubin Urine Negative Negative    Ketones Urine Negative Negative mg/dL    Specific Gravity Urine 1.020 1.003 - 1.035    Blood Urine Negative Negative    pH Urine 7.0 5.0 - 7.0    Protein Albumin Urine Negative Negative mg/dL    Urobilinogen Urine 0.2 0.2, 1.0 E.U./dL    Nitrite Urine Negative Negative    Leukocyte Esterase Urine Negative Negative    Narrative    Microscopic not indicated       X-Ray was not done.    ASSESSMENT:      ICD-10-CM    1. Vaginal symptom  N94.9 Wet prep - Clinic Collect      2. Urinary frequency  R35.0 UA macro with reflex to Microscopic and Culture - Clinc Collect      3. BV (bacterial vaginosis)  N76.0 metroNIDAZOLE (FLAGYL) 500 MG tablet    B96.89       4. Candidiasis of vagina  B37.31 fluconazole (DIFLUCAN) 150 MG tablet      5. Elevated blood pressure reading  R03.0            Medical Decision Making:    Differential Diagnosis:  Gyn Problem: Vaginitis:  yeast, trichomonas vaginalis, bacterial vaginosis    Serious Comorbid Conditions:  Adult:  reviewed    PLAN:  Blood pressure to be repeated before discharge.  Patient asked to monitor blood pressure and follow up with PCP for management.      Rx for  Diflucan and flagyl. No etoh while on flagyl.    Followup:    If not improving or if condition worsens, follow up with your Primary Care Provider, If not improving or if conditions worsens over the next 12-24 hours, go to the Emergency Department    There are no Patient Instructions on file for this visit.

## 2023-04-16 ENCOUNTER — HEALTH MAINTENANCE LETTER (OUTPATIENT)
Age: 43
End: 2023-04-16

## 2023-04-21 ENCOUNTER — NURSE TRIAGE (OUTPATIENT)
Dept: NURSING | Facility: CLINIC | Age: 43
End: 2023-04-21
Payer: COMMERCIAL

## 2023-04-21 NOTE — TELEPHONE ENCOUNTER
" Triage Call:    Caller: Patient    Pt calling about an appointment that she had yesterday at 11:15 am, calling to check vaginal culture. Upon reviewing the Pt chart it was realized that although Pt is [facility] PCP this was an outside GYN appointment. Information given to contact that clinic and provider directly for further information.        Protocol Recommended Disposition: Home     Caller verbalized understanding of instructions and questions answered.      Lynda De Luna RN, [place], PHN on 4/21/2023 at 12:59 PM  [facility]  RN utilized sound nursing judgement based on facility triage protocols during this encounter.        Reason for Disposition    Information only question and nurse able to answer    Additional Information    Negative: Nursing judgment    Negative: Nursing judgment    Negative: Nursing judgment    Negative: Nursing judgment    Answer Assessment - Initial Assessment Questions  1. REASON FOR CALL or QUESTION: \"What is your reason for calling today?\" or \"How can I best help you?\" or \"What question do you have that I can help answer?\"      Calling about results form recent appointment    Protocols used: INFORMATION ONLY CALL - NO TRIAGE-A-[place]      "

## 2023-08-15 ENCOUNTER — OFFICE VISIT (OUTPATIENT)
Dept: URGENT CARE | Facility: URGENT CARE | Age: 43
End: 2023-08-15
Payer: COMMERCIAL

## 2023-08-15 VITALS
BODY MASS INDEX: 25.84 KG/M2 | SYSTOLIC BLOOD PRESSURE: 141 MMHG | DIASTOLIC BLOOD PRESSURE: 93 MMHG | HEART RATE: 107 BPM | TEMPERATURE: 99.7 F | WEIGHT: 165 LBS | OXYGEN SATURATION: 97 %

## 2023-08-15 DIAGNOSIS — R30.0 DYSURIA: Primary | ICD-10-CM

## 2023-08-15 DIAGNOSIS — N76.0 BACTERIAL VAGINOSIS: ICD-10-CM

## 2023-08-15 DIAGNOSIS — B96.89 BACTERIAL VAGINOSIS: ICD-10-CM

## 2023-08-15 LAB
ALBUMIN UR-MCNC: 30 MG/DL
APPEARANCE UR: CLEAR
BACTERIA #/AREA URNS HPF: ABNORMAL /HPF
BILIRUB UR QL STRIP: NEGATIVE
CLUE CELLS: PRESENT
COLOR UR AUTO: YELLOW
GLUCOSE UR STRIP-MCNC: NEGATIVE MG/DL
HGB UR QL STRIP: NEGATIVE
KETONES UR STRIP-MCNC: ABNORMAL MG/DL
LEUKOCYTE ESTERASE UR QL STRIP: NEGATIVE
NITRATE UR QL: NEGATIVE
PH UR STRIP: 5.5 [PH] (ref 5–7)
RBC #/AREA URNS AUTO: ABNORMAL /HPF
SP GR UR STRIP: >=1.03 (ref 1–1.03)
SQUAMOUS #/AREA URNS AUTO: ABNORMAL /LPF
TRICHOMONAS, WET PREP: ABNORMAL
UROBILINOGEN UR STRIP-ACNC: 0.2 E.U./DL
WBC #/AREA URNS AUTO: ABNORMAL /HPF
WBC'S/HIGH POWER FIELD, WET PREP: ABNORMAL
YEAST, WET PREP: ABNORMAL

## 2023-08-15 PROCEDURE — 87210 SMEAR WET MOUNT SALINE/INK: CPT

## 2023-08-15 PROCEDURE — 81001 URINALYSIS AUTO W/SCOPE: CPT

## 2023-08-15 PROCEDURE — 99213 OFFICE O/P EST LOW 20 MIN: CPT

## 2023-08-15 RX ORDER — METRONIDAZOLE 500 MG/1
500 TABLET ORAL 2 TIMES DAILY
Qty: 14 TABLET | Refills: 0 | Status: SHIPPED | OUTPATIENT
Start: 2023-08-15 | End: 2023-08-22

## 2023-08-15 NOTE — PROGRESS NOTES
Assessment & Plan     Bacterial vaginosis    - Wet prep - Clinic Collect  - metroNIDAZOLE (FLAGYL) 500 MG tablet; Take 1 tablet (500 mg) by mouth 2 times daily for 7 days    +clue cells- will treat with Flagyl.    Dysuria    - UA Macroscopic with reflex to Microscopic and Culture - Clinic Collect  - UA Microscopic with Reflex to Culture    Reassured UA is negative- continue with increased fluids.  Close Follow-up if no change or new or worsening sx prn.    Casi Vargas MD   Urgent Care Provider  [facility] [place] is a 43 year old, presenting for the following health issues:  Vaginal Discharge (Frequent urination, lower abdominal pain)      HPI     Presents with suprapubic pain and urinary frequency.  Hx of Group B strep UTIs per patient.  No fever or flank pain.    Review of Systems   Constitutional, HEENT, cardiovascular, pulmonary, GI, , musculoskeletal, neuro, skin, endocrine and psych systems are negative, except as otherwise noted.      Objective    BP (!) 141/93 (BP Location: Left arm, Patient Position: Sitting, Cuff Size: Adult Regular)   Pulse 107   Temp 99.7  F (37.6  C) (Tympanic)   Wt 74.8 kg (165 lb)   LMP 07/22/2023 (Approximate)   SpO2 97%   BMI 25.84 kg/m    Body mass index is 25.84 kg/m .  Physical Exam   GENERAL: healthy, alert and no distress  EYES: Eyes grossly normal to inspection, PERRL and conjunctivae and sclerae normal  PSYCH: mentation appears normal, affect normal/bright    Results for orders placed or performed in visit on 08/15/23 (from the past 24 hour(s))   UA Macroscopic with reflex to Microscopic and Culture - Clinic Collect    Specimen: Urine, Midstream   Result Value Ref Range    Color Urine Yellow Colorless, Straw, Light Yellow, Yellow    Appearance Urine Clear Clear    Glucose Urine Negative Negative mg/dL    Bilirubin Urine Negative Negative    Ketones Urine Trace (A) Negative mg/dL    Specific Gravity Urine >=1.030 1.003 -  1.035    Blood Urine Negative Negative    pH Urine 5.5 5.0 - 7.0    Protein Albumin Urine 30 (A) Negative mg/dL    Urobilinogen Urine 0.2 0.2, 1.0 E.U./dL    Nitrite Urine Negative Negative    Leukocyte Esterase Urine Negative Negative   Wet prep - Clinic Collect    Specimen: Vagina; Swab   Result Value Ref Range    Trichomonas Absent Absent    Yeast Absent Absent    Clue Cells Present (A) Absent    WBCs/high power field 2+ (A) None   UA Microscopic with Reflex to Culture   Result Value Ref Range    Bacteria Urine None Seen None Seen /HPF    RBC Urine 0-2 0-2 /HPF /HPF    WBC Urine 0-5 0-5 /HPF /HPF    Squamous Epithelials Urine Few (A) None Seen /LPF    Narrative    Urine Culture not indicated

## 2023-12-02 ENCOUNTER — OFFICE VISIT (OUTPATIENT)
Dept: URGENT CARE | Facility: URGENT CARE | Age: 43
End: 2023-12-02
Payer: COMMERCIAL

## 2023-12-02 VITALS
RESPIRATION RATE: 16 BRPM | OXYGEN SATURATION: 100 % | HEART RATE: 91 BPM | TEMPERATURE: 97.8 F | WEIGHT: 165 LBS | BODY MASS INDEX: 25.84 KG/M2 | DIASTOLIC BLOOD PRESSURE: 91 MMHG | SYSTOLIC BLOOD PRESSURE: 132 MMHG

## 2023-12-02 DIAGNOSIS — R31.29 MICROSCOPIC HEMATURIA: ICD-10-CM

## 2023-12-02 DIAGNOSIS — R30.0 DYSURIA: Primary | ICD-10-CM

## 2023-12-02 DIAGNOSIS — N89.8 VAGINAL IRRITATION: ICD-10-CM

## 2023-12-02 LAB
ALBUMIN UR-MCNC: NEGATIVE MG/DL
APPEARANCE UR: CLEAR
BACTERIA #/AREA URNS HPF: ABNORMAL /HPF
BILIRUB UR QL STRIP: NEGATIVE
CLUE CELLS: ABNORMAL
COLOR UR AUTO: YELLOW
GLUCOSE UR STRIP-MCNC: NEGATIVE MG/DL
HGB UR QL STRIP: ABNORMAL
KETONES UR STRIP-MCNC: NEGATIVE MG/DL
LEUKOCYTE ESTERASE UR QL STRIP: NEGATIVE
NITRATE UR QL: NEGATIVE
PH UR STRIP: 6 [PH] (ref 5–7)
RBC #/AREA URNS AUTO: ABNORMAL /HPF
SP GR UR STRIP: 1.02 (ref 1–1.03)
SQUAMOUS #/AREA URNS AUTO: ABNORMAL /LPF
TRICHOMONAS, WET PREP: ABNORMAL
UROBILINOGEN UR STRIP-ACNC: 0.2 E.U./DL
WBC #/AREA URNS AUTO: ABNORMAL /HPF
WBC'S/HIGH POWER FIELD, WET PREP: ABNORMAL
YEAST, WET PREP: ABNORMAL

## 2023-12-02 PROCEDURE — 87210 SMEAR WET MOUNT SALINE/INK: CPT | Performed by: PHYSICIAN ASSISTANT

## 2023-12-02 PROCEDURE — 81001 URINALYSIS AUTO W/SCOPE: CPT | Performed by: PHYSICIAN ASSISTANT

## 2023-12-02 PROCEDURE — 99214 OFFICE O/P EST MOD 30 MIN: CPT | Performed by: PHYSICIAN ASSISTANT

## 2023-12-02 RX ORDER — CEPHALEXIN 500 MG/1
500 CAPSULE ORAL 3 TIMES DAILY
Qty: 21 CAPSULE | Refills: 0 | Status: SHIPPED | OUTPATIENT
Start: 2023-12-02 | End: 2023-12-09

## 2023-12-02 RX ORDER — METRONIDAZOLE 500 MG/1
500 TABLET ORAL 2 TIMES DAILY
Qty: 14 TABLET | Refills: 0 | Status: SHIPPED | OUTPATIENT
Start: 2023-12-02 | End: 2023-12-09

## 2023-12-02 NOTE — PROGRESS NOTES
SUBJECTIVE:   Yun Marques is a 43 year old female who  presents today for a possible BV. Vaginal and urinary irritation.  Some discharge change.  Feels like previous BV and UTI.  Patient requests treatment and will follow wioth PCP this week    Past Medical History:   Diagnosis Date    Acute sinusitis with symptoms > 10 days 9/9/2022    Current moderate episode of major depressive disorder without prior episode (H)     Uncomplicated asthma      Current Outpatient Medications   Medication Sig Dispense Refill    albuterol (PROAIR HFA/PROVENTIL HFA/VENTOLIN HFA) 108 (90 Base) MCG/ACT inhaler INHALE 2 PUFFS INTO THE LUNGS EVERY 6 HOURS 18 g 0    fluticasone (FLONASE) 50 MCG/ACT nasal spray       fluticasone (FLONASE) 50 MCG/ACT nasal spray SHAKE LIQUID AND USE 2 SPRAYS IN EACH NOSTRIL EVERY DAY      fluticasone-salmeterol (ADVAIR) 250-50 MCG/ACT inhaler Inhale 1 puff into the lungs every 12 hours 60 each 1    omeprazole (PRILOSEC) 20 MG DR capsule TAKE 1 CAPSULE(20 MG) BY MOUTH DAILY 90 capsule 3    Blood Pressure KIT Take BP twice daily (Patient not taking: Reported on 8/15/2023) 1 kit 0    Lactobacillus Rhamnosus, GG, (CULTURELLE KIDS) CHEW  (Patient not taking: Reported on 8/15/2023)      loratadine-pseudoePHEDrine (CLARITIN-D 12-HOUR) 5-120 MG 12 hr tablet Take 1 tablet by mouth 2 times daily (Patient not taking: Reported on 8/15/2023) 60 tablet 3    sertraline (ZOLOFT) 25 MG tablet Take 1 tablet (25 mg) by mouth daily (Patient not taking: Reported on 8/15/2023) 30 tablet 1    simethicone (MYLICON) 80 MG chewable tablet Take 1 tablet (80 mg) by mouth every 6 hours as needed for flatulence or cramping (Patient not taking: Reported on 8/15/2023) 30 tablet 1    tiZANidine (ZANAFLEX) 4 MG tablet TAKE 1 TABLET(4 MG) BY MOUTH EVERY NIGHT AS NEEDED FOR MUSCLE SPASMS (Patient not taking: Reported on 8/15/2023) 30 tablet 0     Social History     Tobacco Use    Smoking status: Former     Years: 10     Types: Cigarettes      Start date: 2000     Quit date: 2017     Years since quittin.9    Smokeless tobacco: Never    Tobacco comments:     1-2 cigarettes per month(socially) - 09/26/15: hasn't smoked in months   Substance Use Topics    Alcohol use: Yes       ROS:   Review of systems negative except as stated above.    OBJECTIVE:  BP (!) 132/91 (BP Location: Left arm, Patient Position: Sitting, Cuff Size: Adult Regular)   Pulse 91   Temp 97.8  F (36.6  C) (Oral)   Resp 16   Wt 74.8 kg (165 lb)   LMP 2023 (Exact Date)   SpO2 100%   Breastfeeding No   BMI 25.84 kg/m    GENERAL APPEARANCE: healthy, alert and no distress  RESP: lungs clear to auscultation - no rales, rhonchi or wheezes  CV: regular rates and rhythm, normal S1 S2, no murmur noted  BACK: CVA tenderness  SKIN: no suspicious lesions or rashes      Results for orders placed or performed in visit on 23   UA Macroscopic with reflex to Microscopic and Culture - Clinic Collect     Status: Abnormal    Specimen: Urine, Clean Catch   Result Value Ref Range    Color Urine Yellow Colorless, Straw, Light Yellow, Yellow    Appearance Urine Clear Clear    Glucose Urine Negative Negative mg/dL    Bilirubin Urine Negative Negative    Ketones Urine Negative Negative mg/dL    Specific Gravity Urine 1.020 1.003 - 1.035    Blood Urine Trace (A) Negative    pH Urine 6.0 5.0 - 7.0    Protein Albumin Urine Negative Negative mg/dL    Urobilinogen Urine 0.2 0.2, 1.0 E.U./dL    Nitrite Urine Negative Negative    Leukocyte Esterase Urine Negative Negative   UA Microscopic with Reflex to Culture     Status: Abnormal   Result Value Ref Range    Bacteria Urine Few (A) None Seen /HPF    RBC Urine 0-2 0-2 /HPF /HPF    WBC Urine 0-5 0-5 /HPF /HPF    Squamous Epithelials Urine Few (A) None Seen /LPF    Narrative    Urine Culture not indicated   Wet prep - Clinic Collect     Status: Abnormal    Specimen: Vagina; Swab   Result Value Ref Range    Trichomonas Absent Absent    Yeast  Absent Absent    Clue Cells Absent Absent    WBCs/high power field 1+ (A) None       ASSESSMENT:   (R30.0) Dysuria  (primary encounter diagnosis)  (R31.29) Microscopic hematuria  Plan: UA Macroscopic with reflex to Microscopic and         Culture - Clinic Collect, UA Microscopic with         Reflex to Culture, cephALEXin (KEFLEX) 500 MG         capsule    (N89.8) Vaginal irritation  Plan: metroNIDAZOLE (FLAGYL) 500 MG tablet      FOllow up this week with PCP as discussed

## 2024-01-25 ENCOUNTER — OFFICE VISIT (OUTPATIENT)
Dept: URGENT CARE | Facility: URGENT CARE | Age: 44
End: 2024-01-25
Payer: COMMERCIAL

## 2024-01-25 VITALS
TEMPERATURE: 99.1 F | SYSTOLIC BLOOD PRESSURE: 171 MMHG | HEART RATE: 91 BPM | OXYGEN SATURATION: 99 % | DIASTOLIC BLOOD PRESSURE: 112 MMHG

## 2024-01-25 DIAGNOSIS — I10 HYPERTENSION, UNSPECIFIED TYPE: Primary | ICD-10-CM

## 2024-01-25 DIAGNOSIS — R20.0 NUMBNESS AND TINGLING IN LEFT ARM: ICD-10-CM

## 2024-01-25 DIAGNOSIS — R20.2 NUMBNESS AND TINGLING IN LEFT ARM: ICD-10-CM

## 2024-01-25 PROCEDURE — 93000 ELECTROCARDIOGRAM COMPLETE: CPT

## 2024-01-25 PROCEDURE — 99214 OFFICE O/P EST MOD 30 MIN: CPT | Mod: 25

## 2024-01-25 RX ORDER — AMLODIPINE BESYLATE 5 MG/1
5 TABLET ORAL DAILY
Qty: 30 TABLET | Refills: 0 | Status: SHIPPED | OUTPATIENT
Start: 2024-01-25

## 2024-01-25 RX ORDER — AMLODIPINE BESYLATE 5 MG/1
1 TABLET ORAL
COMMUNITY
Start: 2023-11-12

## 2024-01-25 NOTE — PATIENT INSTRUCTIONS
EKG does not show a heart attack or any irregularities in the clinic today.  Restart amlodipine today.  Check your blood pressure two-three times a day.  Record these values as these are helpful to monitor how under control your blood pressure is while taking the medication.  Follow up with your primary care provider for a recheck.  Go to the emergency department with any new or worsening symptoms.

## 2024-01-25 NOTE — PROGRESS NOTES
Assessment & Plan   (I10) Hypertension, unspecified type  (primary encounter diagnosis)  Plan: amLODIPine (NORVASC) 5 MG tablet    (R20.0,  R20.2) Numbness and tingling in left arm  Plan: EKG 12-lead complete w/read - Clinics    Given the patient's lack of focal neurological deficits, normal EKG and normal exam; the patient was restarted on her amlodipine.  I suspect that her ADALI and depression history are contributing to additional symptoms such as the left-sided arm numbness and brain fog within the context of the elevated blood pressure reading that was discussed with her earlier today at her work.  We discussed that her EKG does not show a heart attack or any irregularities in the clinic today.  We also discussed the need for her to restart her amlodipine today and check her blood pressure 2-3 times a day.  Informed her to record these values as they are helpful to monitor how under control her blood pressure is while taking the medication.  We discussed the need for her to follow-up with her primary care provider for recheck.  Instructed her to go to the emergency department with any new or worsening symptoms.  Patient acknowledged her understanding of the above plan.    The use of Dragon/Carma dictation services may have been used to construct the content in this note; any grammatical or spelling errors are non-intentional. Please contact the author of this note directly if you are in need of any clarification.      [facility] Malcolm is a 43 year old female who presents to clinic today for the following health issues:  Chief Complaint   Patient presents with    Hypertension     - Bp was 180/100 this AM.  - Nurse at worked was concerned and advised her to be seen right away  - States that she has not have meds for her BP since Nov  - Numbness in Left Arm  - Brain Fog  - Denies Chest Pain     HPI  Patient reports having an elevated  blood pressure at work earlier today.  She states that she has not been taking her blood pressure medication since November of last year.  She indicates that she had been feeling a little bit of numbness in her left arm and brain fog earlier today.  She denies any chest pain, lightheadedness, dizziness, blurred vision or vision changes.      ROS:  Negative except noted above.        Objective    BP (!) 171/112   Pulse 91   Temp 99.1  F (37.3  C) (Tympanic)   LMP 11/13/2023 (Exact Date)   SpO2 99%   Physical Exam   GENERAL: alert and no distress  EYES: Eyes grossly normal to inspection, PERRL and conjunctivae and sclerae normal  HENT: nose and mouth without ulcers or lesions, oropharynx clear, and oral mucous membranes moist  NECK: no adenopathy, no asymmetry, masses, or scars  RESP: lungs clear to auscultation - no rales, rhonchi or wheezes  CV: regular rate and rhythm, normal S1 S2, no S3 or S4, no murmur, click or rub, no peripheral edema  MS: no gross musculoskeletal defects noted, no edema  SKIN: no suspicious lesions or rashes  NEURO: Normal strength and tone, mentation intact and speech normal.  CNs grossly normal  PSYCH: mentation appears normal, affect normal/bright

## 2024-06-23 ENCOUNTER — HEALTH MAINTENANCE LETTER (OUTPATIENT)
Age: 44
End: 2024-06-23

## 2024-08-23 DIAGNOSIS — F32.1 CURRENT MODERATE EPISODE OF MAJOR DEPRESSIVE DISORDER WITHOUT PRIOR EPISODE (H): ICD-10-CM

## 2024-08-23 DIAGNOSIS — F41.1 GENERALIZED ANXIETY DISORDER: ICD-10-CM

## 2024-08-26 NOTE — TELEPHONE ENCOUNTER
Clinic RN: Please contact patient because patient should have run out of this medication on 11/2022. Confirm patient is taking this medication as prescribed. Document findings and route refill encounter to provider for approval or denial.

## 2024-08-26 NOTE — TELEPHONE ENCOUNTER
Patient Contact    Attempt # 1    Was call answered?  No.  Left message on voicemail with information to call me back.    Thank you,  Holly Steven RN

## 2024-08-28 ENCOUNTER — TELEPHONE (OUTPATIENT)
Dept: FAMILY MEDICINE | Facility: CLINIC | Age: 44
End: 2024-08-28
Payer: COMMERCIAL

## 2024-08-28 NOTE — TELEPHONE ENCOUNTER
Chart review shows pt has changed to different family medicine provider. Pt states she does not need refill of zoloft. RN informs pt if she has changed clinics and needs it, she should review with her new provider.     Ruby Bowman RN

## 2024-08-28 NOTE — TELEPHONE ENCOUNTER
Triage Patient Outreach    Attempt # 2    Was call answered?  No.  Left voicemail to return call to Triage at Primary Clinic    Delaney Sharma RN

## 2024-08-28 NOTE — TELEPHONE ENCOUNTER
Clinic RN: Please contact patient because patient should have run out of this medication on 11/22/22. Confirm patient is taking this medication as prescribed. Document findings and route refill encounter to provider for approval or denial.

## 2024-08-29 RX ORDER — SERTRALINE HYDROCHLORIDE 25 MG/1
25 TABLET, FILM COATED ORAL DAILY
Qty: 30 TABLET | Refills: 1 | OUTPATIENT
Start: 2024-08-29

## 2024-08-29 NOTE — TELEPHONE ENCOUNTER
.Triage Patient Outreach    Attempt # 3    Was call answered?  No.  Left voicemail to return call to Triage at Primary Clinic    Camilla Burt RN

## 2025-03-02 ENCOUNTER — HEALTH MAINTENANCE LETTER (OUTPATIENT)
Age: 45
End: 2025-03-02

## 2025-05-27 ENCOUNTER — TELEPHONE (OUTPATIENT)
Dept: FAMILY MEDICINE | Facility: CLINIC | Age: 45
End: 2025-05-27
Payer: COMMERCIAL

## 2025-07-12 ENCOUNTER — HEALTH MAINTENANCE LETTER (OUTPATIENT)
Age: 45
End: 2025-07-12